# Patient Record
Sex: FEMALE | Race: WHITE | Employment: OTHER | URBAN - METROPOLITAN AREA
[De-identification: names, ages, dates, MRNs, and addresses within clinical notes are randomized per-mention and may not be internally consistent; named-entity substitution may affect disease eponyms.]

---

## 2018-09-06 ENCOUNTER — OFFICE VISIT (OUTPATIENT)
Dept: UROLOGY | Age: 38
End: 2018-09-06

## 2018-09-06 VITALS
SYSTOLIC BLOOD PRESSURE: 93 MMHG | HEIGHT: 67 IN | DIASTOLIC BLOOD PRESSURE: 59 MMHG | OXYGEN SATURATION: 98 % | WEIGHT: 174 LBS | BODY MASS INDEX: 27.31 KG/M2 | HEART RATE: 76 BPM

## 2018-09-06 DIAGNOSIS — N13.5 OBSTRUCTION OF RIGHT URETEROPELVIC JUNCTION (UPJ): ICD-10-CM

## 2018-09-06 DIAGNOSIS — N39.0 RECURRENT UTI: Primary | ICD-10-CM

## 2018-09-06 NOTE — MR AVS SNAPSHOT
[facility] [address] [address] 
[phone] Patient: Ailyn Hendricks MRN: [id number] MBN:[id number] Visit Information Date & Time Provider Department Dept. Phone Encounter #  
 9/6/2018  2:30 PM Bill Rodas [place] [facility] [phone] [id number] Follow-up Instructions Return in about 1 month (around 10/6/2018). Follow-up and Disposition History Your Appointments 10/15/2018  2:15 PM  
Office Visit with Bud Keating MD  
[facility] Samantha Vargheseg) Appt Note: after ct scan [address] [facility] [address] [address]  
[phone] [address] [place] [address] Upcoming Health Maintenance Date Due DTaP/Tdap/Td series (1 - Tdap) 7/23/2000 PAP AKA CERVICAL CYTOLOGY 7/23/2000 Influenza Age 5 to Adult 8/1/2018 Allergies as of 9/6/2018  Review Complete On: 9/6/2018 By: Bud Keating MD  
 No Known Allergies Current Immunizations  Never Reviewed No immunizations on file. Not reviewed this visit You Were Diagnosed With   
  
 Codes Comments Recurrent UTI    -  Primary ICD-10-CM: N39.0 ICD-9-CM: 599.0 Obstruction of right ureteropelvic junction (UPJ)     ICD-10-CM: N13.5 ICD-9-CM: 593.4 Vitals BP Pulse Height(growth percentile) Weight(growth percentile) SpO2 BMI  
 93/59 (BP 1 Location: Left arm, BP Patient Position: Sitting) 76 5' 7\" (1.702 m) 174 lb (78.9 kg) 98% 27.25 kg/m2 Smoking Status Never Smoker Vitals History BMI and BSA Data Body Mass Index Body Surface Area  
 27.25 kg/m 2 1.93 m 2 Your Updated Medication List  
  
   
This list is accurate as of 9/6/18  3:48 PM.  Always use your most recent med list.  
  
  
  
  
 DOXYCYCLINE CALCIUM PO Take  by mouth. We Performed the Following AMB POC URINALYSIS DIP STICK AUTO W/O MICRO [20279 CPT(R)] Follow-up Instructions Return in about 1 month (around 10/6/2018). Patient Instructions Urinary Tract Infection in Pregnancy: Care Instructions Your Care Instructions A urinary tract infection, or UTI, is an infection of the bladder and other urinary structures. Most UTIs occur in the bladder. They often cause pain or burning when you urinate. UTI is the most common bacterial infection in pregnancy. If untreated, a UTI could lead to problems such as a kidney infection or  labor. Most UTIs can be cured with antibiotics. Your doctor will prescribe an antibiotic that is safe during pregnancy. Be sure to finish your medicine so that the infection does not spread to your kidneys. Follow-up care is a key part of your treatment and safety. Be sure to make and go to all appointments, and call your doctor if you are having problems. It's also a good idea to know your test results and keep a list of the medicines you take. How can you care for yourself at home? · Take your antibiotics as directed. Do not stop taking them just because you feel better. You need to take the full course of antibiotics. · Drink extra water and other fluids for the next day or two. This will help wash out the bacteria causing the infection. If you have kidney, heart, or liver disease and have to limit fluids, talk with your doctor before you increase the amount of fluids you drink. · Do not drink alcohol. · Urinate often. Try to empty your bladder each time. Preventing UTIs · Drink plenty of fluids, enough so that your urine is light yellow or clear like water. This helps you urinate often, which clears bacteria from your system. If you have kidney, heart, or liver disease and have to limit fluids, talk with your doctor before you increase the amount of fluids you drink. · Urinate when you first have the urge. · Urinate right after you have sex. This is the best way for women to avoid UTIs. · When going to the bathroom, wipe from front to back to keep bacteria from entering the vagina or urethra. When should you call for help? Call your doctor now or seek immediate medical care if: 
  · You have symptoms of a worse urinary tract infection. These may include: 
¨ Pain or burning when you urinate. ¨ A frequent need to urinate without being able to pass much urine. ¨ Pain in the flank, which is just below the rib cage and above the waist on either side of the back. ¨ Blood in your urine. ¨ A fever.  
 Watch closely for changes in your health, and be sure to contact your doctor if: 
  · You do not get better as expected. Where can you learn more? Go to http://kayleigh-felix.info/. Enter M982 in the search box to learn more about \"Urinary Tract Infection in Pregnancy: Care Instructions. \" Current as of: November 21, 2017 Content Version: 11.7 © 8669-0202 [employer]. Care instructions adapted under license by [employer] (which disclaims liability or warranty for this information). If you have questions about a medical condition or this instruction, always ask your healthcare professional. [place] [address] any warranty or liability for your use of this information. Patient Instructions History Introducing [facility]! [employer] introduces SocioSquare patient portal. Now you can access parts of your medical record, email your doctor's office, and request medication refills online. 1. In your internet browser, go to [email or web address] 2. Click on the First Time User? Click Here link in the Sign In box. You will see the New Member Sign Up page. 3. Enter your SocioSquare Access Code exactly as it appears below. You will not need to use this code after youve completed the sign-up process.  If you do not sign up before the expiration date, you must request a new code. · TechSkills Access Code: [email or web address] Expires: 12/5/2018  3:48 PM 
 
4. Enter the last four digits of your Social Security Number (xxxx) and Date of Birth (mm/dd/yyyy) as indicated and click Submit. You will be taken to the next sign-up page. 5. Create a TechSkills ID. This will be your TechSkills login ID and cannot be changed, so think of one that is secure and easy to remember. 6. Create a TechSkills password. You can change your password at any time. 7. Enter your Password Reset Question and Answer. This can be used at a later time if you forget your password. 8. Enter your e-mail address. You will receive e-mail notification when new information is available in [address] 9. Click Sign Up. You can now view and download portions of your medical record. 10. Click the Download Summary menu link to download a portable copy of your medical information. If you have questions, please visit the Frequently Asked Questions section of the TechSkills website. Remember, TechSkills is NOT to be used for urgent needs. For medical emergencies, dial 911. Now available from your iPhone and Android! Please provide this summary of care documentation to your next provider. Your primary care clinician is listed as Phys Other. If you have any questions after today's visit, please call [phone].

## 2018-09-06 NOTE — PROGRESS NOTES
Ms. Colt Martines has a reminder for a \"due or due soon\" health maintenance. I have asked that she contact her primary care provider for follow-up on this health maintenance.

## 2018-09-06 NOTE — PROGRESS NOTES
Chief Complaint Patient presents with  New Patient  Recurrent UTI HISTORY OF PRESENT ILLNESS:  Shanita Crisostomo is a 43 y.o. female comes in with a history of some recurrent urinary infections manifested by bladder discomfort urinary frequency and urgency and nocturia. Rarely has she ever had any fever but she did one time earlier this year. She also had some right sided abdominal discomfort but not specifically CVA pain. She has not had any gross hematuria, has never had any stone disease. She has a 9year-old child but had no problems with urinary infections during that pregnancy. She said that she had an evaluation of her infections performed once before and was told she had some type of  \"natural\" blockage of that right kidney. It was never evaluated further after that. She has what sounds like a CT scan but I do not have access to any of those films. Past Medical History:  
Diagnosis Date  Acid indigestion History reviewed. No pertinent surgical history. Social History Substance Use Topics  Smoking status: Never Smoker  Smokeless tobacco: Never Used  Alcohol use Yes No Known Allergies Family History Problem Relation Age of Onset  Hypertension Mother  Cancer Paternal Grandfather Current Outpatient Prescriptions Medication Sig Dispense Refill  DOXYCYCLINE CALCIUM PO Take  by mouth. REVIEW OF SYSTEMS:  
Documented on the chart PHYSICAL EXAMINATION:  
 
Visit Vitals  BP 93/59 (BP 1 Location: Left arm, BP Patient Position: Sitting)  Pulse 76  Ht 5' 7\" (1.702 m)  Wt 174 lb (78.9 kg)  SpO2 98%  BMI 27.25 kg/m2 Constitutional: Well developed, well-nourished female in no acute distress. CV:  No peripheral swelling noted Respiratory: No respiratory distress or difficulties Abdomen:  Soft and nontender. No masses. No hepatosplenomegaly.  Female:  No CVA tenderness. A formal pelvic exam is not performed today. Skin:  Normal color. No evidence of jaundice. Neuro/Psych:  Patient with appropriate affect. Alert and oriented. Lymphatic:   No enlargement of supraclavicular lymph nodes. Results for orders placed or performed during the hospital encounter of 02/15/18 POC URINE MACROSCOPIC Result Value Ref Range Glucose Negative NEGATIVE,Negative mg/dl Bilirubin Negative NEGATIVE,Negative Ketone Negative NEGATIVE,Negative mg/dl Specific gravity 1.015 1.005 - 1.030 Blood Moderate (A) NEGATIVE,Negative pH (UA) 5.5 5 - 9 Protein 100 (A) NEGATIVE,Negative mg/dl Urobilinogen 0.2 0.0 - 1.0 EU/dl Nitrites Positive (A) NEGATIVE,Negative Leukocyte Esterase Large (A) NEGATIVE,Negative Color Yellow Appearance Slightly Cloudy POC URINE MICROSCOPIC Result Value Ref Range Epithelial cells, squamous 5-9 /LPF WBC TNTC /HPF  
 RBC 5-9 /HPF Bacteria 3+ /HPF  
POC HCG,URINE Result Value Ref Range HCG urine, QL negative NEGATIVE,Negative,negative REVIEW OF LABS AND IMAGING:   
 
Imaging Report Reviewed? NO Images Reviewed? NO Other Lab Data Reviewed? YES  
 
ASSESSMENT:  
  ICD-10-CM ICD-9-CM 1. Recurrent UTI N39.0 599.0 AMB POC URINALYSIS DIP STICK AUTO W/O MICRO 2. Obstruction of right ureteropelvic junction (UPJ) N13.5 593.4 PLAN/DISCUSSION: A review of her past lab data show an infection back in February with pyuria and some microhematuria as well as a positive nitrate test and I think that is when she had her fever. I would like to reevaluate that kidney with a renal ultrasound and a Lasix washout study. Will make arrangements to that and I will talk to her after those studies are complete. Her urinalysis today is completely normal so there is no need for any antibiotics today. Patient voices understanding and agreement to the plan. Lawrence Jackson MD on 9/6/2018 Please note: This document has been produced using voice recognition software. Unrecognized errors in transcription may be present.

## 2018-09-06 NOTE — PROGRESS NOTES
RBV Per Dr. Gustavo Crystal order placed for patient to have renal ultrasound for UPJ obstruction. RBV Per Dr. Gustavo Crystal order placed for patient to have Lasix washout renal scan for UPJ obstruction.

## 2018-09-06 NOTE — PATIENT INSTRUCTIONS
Urinary Tract Infection in Pregnancy: Care Instructions Your Care Instructions A urinary tract infection, or UTI, is an infection of the bladder and other urinary structures. Most UTIs occur in the bladder. They often cause pain or burning when you urinate. UTI is the most common bacterial infection in pregnancy. If untreated, a UTI could lead to problems such as a kidney infection or  labor. Most UTIs can be cured with antibiotics. Your doctor will prescribe an antibiotic that is safe during pregnancy. Be sure to finish your medicine so that the infection does not spread to your kidneys. Follow-up care is a key part of your treatment and safety. Be sure to make and go to all appointments, and call your doctor if you are having problems. It's also a good idea to know your test results and keep a list of the medicines you take. How can you care for yourself at home? · Take your antibiotics as directed. Do not stop taking them just because you feel better. You need to take the full course of antibiotics. · Drink extra water and other fluids for the next day or two. This will help wash out the bacteria causing the infection. If you have kidney, heart, or liver disease and have to limit fluids, talk with your doctor before you increase the amount of fluids you drink. · Do not drink alcohol. · Urinate often. Try to empty your bladder each time. Preventing UTIs · Drink plenty of fluids, enough so that your urine is light yellow or clear like water. This helps you urinate often, which clears bacteria from your system. If you have kidney, heart, or liver disease and have to limit fluids, talk with your doctor before you increase the amount of fluids you drink. · Urinate when you first have the urge. · Urinate right after you have sex. This is the best way for women to avoid UTIs. · When going to the bathroom, wipe from front to back to keep bacteria from entering the vagina or urethra. When should you call for help? Call your doctor now or seek immediate medical care if: 
  · You have symptoms of a worse urinary tract infection. These may include: 
¨ Pain or burning when you urinate. ¨ A frequent need to urinate without being able to pass much urine. ¨ Pain in the flank, which is just below the rib cage and above the waist on either side of the back. ¨ Blood in your urine. ¨ A fever.  
 Watch closely for changes in your health, and be sure to contact your doctor if: 
  · You do not get better as expected. Where can you learn more? Go to http://kayleigh-felix.info/. Enter M982 in the search box to learn more about \"Urinary Tract Infection in Pregnancy: Care Instructions. \" Current as of: November 21, 2017 Content Version: 11.7 © 7635-1091 [employer]. Care instructions adapted under license by [employer] (which disclaims liability or warranty for this information). If you have questions about a medical condition or this instruction, always ask your healthcare professional. [place] [address] any warranty or liability for your use of this information.

## 2018-09-07 LAB
BILIRUB UR QL STRIP: NEGATIVE
GLUCOSE UR-MCNC: NEGATIVE MG/DL
KETONES P FAST UR STRIP-MCNC: NEGATIVE MG/DL
PH UR STRIP: 5.5 [PH] (ref 4.6–8)
PROT UR QL STRIP: NEGATIVE
SP GR UR STRIP: 1.01 (ref 1–1.03)
UA UROBILINOGEN AMB POC: NORMAL (ref 0.2–1)
URINALYSIS CLARITY POC: CLEAR
URINALYSIS COLOR POC: YELLOW
URINE BLOOD POC: NEGATIVE
URINE LEUKOCYTES POC: NEGATIVE
URINE NITRITES POC: NEGATIVE

## 2018-09-19 ENCOUNTER — HOSPITAL ENCOUNTER (OUTPATIENT)
Dept: ULTRASOUND IMAGING | Age: 38
Discharge: HOME | End: 2018-09-19
Attending: UROLOGY
Payer: OTHER GOVERNMENT

## 2018-09-19 ENCOUNTER — HOSPITAL ENCOUNTER (OUTPATIENT)
Dept: NUCLEAR MEDICINE | Age: 38
Discharge: HOME | End: 2018-09-19
Attending: UROLOGY
Payer: OTHER GOVERNMENT

## 2018-09-19 DIAGNOSIS — N13.5 OBSTRUCTION OF RIGHT URETEROPELVIC JUNCTION (UPJ): ICD-10-CM

## 2018-09-19 PROCEDURE — 76770 US EXAM ABDO BACK WALL COMP: CPT

## 2018-09-19 PROCEDURE — 74011250636 HC RX REV CODE- 250/636: Performed by: UROLOGY

## 2018-09-19 PROCEDURE — 78708 K FLOW/FUNCT IMAGE W/DRUG: CPT

## 2018-09-19 RX ORDER — FUROSEMIDE 10 MG/ML
40 INJECTION INTRAMUSCULAR; INTRAVENOUS
Status: COMPLETED | OUTPATIENT
Start: 2018-09-19 | End: 2018-09-19

## 2018-09-19 RX ADMIN — FUROSEMIDE 40 MG: 10 INJECTION, SOLUTION INTRAMUSCULAR; INTRAVENOUS at 16:08

## 2018-09-20 NOTE — PROGRESS NOTES
Pelvic junction obstruction has been confirmed and patient has an appointment on 15 October to see Dr. Viry Carr

## 2018-10-15 ENCOUNTER — OFFICE VISIT (OUTPATIENT)
Dept: UROLOGY | Age: 38
End: 2018-10-15

## 2018-10-15 VITALS
OXYGEN SATURATION: 98 % | BODY MASS INDEX: 27.78 KG/M2 | HEIGHT: 67 IN | DIASTOLIC BLOOD PRESSURE: 60 MMHG | HEART RATE: 53 BPM | WEIGHT: 177 LBS | SYSTOLIC BLOOD PRESSURE: 108 MMHG

## 2018-10-15 DIAGNOSIS — N13.30 HYDRONEPHROSIS OF RIGHT KIDNEY: ICD-10-CM

## 2018-10-15 DIAGNOSIS — Q62.11 HYDRONEPHROSIS WITH URETEROPELVIC JUNCTION (UPJ) OBSTRUCTION: Primary | ICD-10-CM

## 2018-10-15 NOTE — PROGRESS NOTES
Chief Complaint   Patient presents with    Hydronephrosis       HISTORY OF PRESENT ILLNESS:  Maude Mcgregor is a 43 y.o. female presents  back to the office today in follow-up to her renal ultrasound and nuclear renogram.  This confirms significant hydronephrosis of that right kidney secondary to UPJ obstruction. In talking with her further and going over the x-rays, she apparently knew about this but it does not sound like it was severe 5 or 6 years ago. I told her that I think if the findings then were anything like the findings today, she would have had a procedure done at that time. Nevertheless on review of the study there is adequate parenchyma and I think she would benefit from a UPJ repair. I think this could be carried out robotically and that would be the preferential method. Past Medical History:   Diagnosis Date    Acid indigestion        History reviewed. No pertinent surgical history. Social History   Substance Use Topics    Smoking status: Never Smoker    Smokeless tobacco: Never Used    Alcohol use Yes       No Known Allergies    Family History   Problem Relation Age of Onset    Hypertension Mother     Cancer Paternal Grandfather        Current Outpatient Prescriptions   Medication Sig Dispense Refill    DOXYCYCLINE CALCIUM PO Take  by mouth. REVIEW OF SYSTEMS:   Documented on the chart      PHYSICAL EXAMINATION:     Visit Vitals    /60 (BP 1 Location: Left arm, BP Patient Position: Sitting)    Pulse (!) 53    Ht 5' 7\" (1.702 m)    Wt 177 lb (80.3 kg)    SpO2 98%    BMI 27.72 kg/m2     Constitutional: Well developed, well-nourished female in no acute distress. CV:  No peripheral swelling noted  Respiratory: No respiratory distress or difficulties  Abdomen:  Soft and nontender. No masses. No hepatosplenomegaly.  Female:  No CVA tenderness. Skin:  Normal color. No evidence of jaundice. Neuro/Psych:  Patient with appropriate affect.   Alert and oriented. Lymphatic:   No enlargement of supraclavicular lymph nodes. Results for orders placed or performed in visit on 09/06/18   AMB POC URINALYSIS DIP STICK AUTO W/O MICRO   Result Value Ref Range    Color (UA POC) Yellow     Clarity (UA POC) Clear     Glucose (UA POC) Negative Negative    Bilirubin (UA POC) Negative Negative    Ketones (UA POC) Negative Negative    Specific gravity (UA POC) 1.015 1.001 - 1.035    Blood (UA POC) Negative Negative    pH (UA POC) 5.5 4.6 - 8.0    Protein (UA POC) Negative Negative    Urobilinogen (UA POC) 0.2 mg/dL 0.2 - 1    Nitrites (UA POC) Negative Negative    Leukocyte esterase (UA POC) Negative Negative         REVIEW OF LABS AND IMAGING:      Imaging Report Reviewed? YES      Images Reviewed? YES           Other Lab Data Reviewed? YES    ASSESSMENT:     ICD-10-CM ICD-9-CM    1. Hydronephrosis with ureteropelvic junction (UPJ) obstruction Q62.11 591 AMB POC URINALYSIS DIP STICK AUTO W/O MICRO   2. Hydronephrosis of right kidney N13.30 591                 PLAN/DISCUSSION: She has a significant right-sided hydronephrosis with intrarenal and calyceal dilation and some thinning of the cortex in the upper pole but I think this is probably a salvageable kidney. Nevertheless I would prefer her to see a robotic surgeon for further evaluation for preservation of the kidney as well as UPJ repair. She is agreeable and all of her questions have been answered. Patient voices understanding and agreement to the plan. Felice Velez MD on 10/15/2018           Please note: This document has been produced using voice recognition software. Unrecognized errors in transcription may be present.

## 2018-10-15 NOTE — MR AVS SNAPSHOT
[address] [facility] [address] [address] 
[phone] Patient: Kalyani Fung MRN: [id number] DUM:9/29/4716 Visit Information Date & Time Provider Department Dept. Phone Encounter #  
 10/15/2018  2:15 PM [facility]ble[facility] [phone] [id number] Follow-up Instructions Routing History Upcoming Health Maintenance Date Due DTaP/Tdap/Td series (1 - Tdap) 7/23/2000 PAP AKA CERVICAL CYTOLOGY 7/23/2000 Influenza Age 5 to Adult 8/1/2018 Allergies as of 10/15/2018  Review Complete On: 10/15/2018 By: Karla Trimble MD  
 No Known Allergies Current Immunizations  Never Reviewed No immunizations on file. Not reviewed this visit You Were Diagnosed With   
  
 Codes Comments Hydronephrosis with ureteropelvic junction (UPJ) obstruction    -  Primary ICD-10-CM: Q62.11 ICD-9-CM: 575 Hydronephrosis of right kidney     ICD-10-CM: N13.30 ICD-9-CM: 612 Vitals BP Pulse Height(growth percentile) Weight(growth percentile) SpO2 BMI  
 108/60 (BP 1 Location: Left arm, BP Patient Position: Sitting) (!) 53 5' 7\" (1.702 m) 177 lb (80.3 kg) 98% 27.72 kg/m2 Smoking Status Never Smoker Vitals History BMI and BSA Data Body Mass Index Body Surface Area  
 27.72 kg/m 2 1.95 m 2 Your Updated Medication List  
  
   
This list is accurate as of 10/15/18  3:01 PM.  Always use your most recent med list.  
  
  
  
  
 DOXYCYCLINE CALCIUM PO Take  by mouth. We Performed the Following AMB POC URINALYSIS DIP STICK AUTO W/O MICRO [35040 CPT(R)] Patient Instructions Learning About Hydronephrosis What is hydronephrosis? Hydronephrosis is swelling of the kidneys. It is caused by a buildup of urine.  This condition can happen if a tube that drains urine from your kidneys is blocked. The blockage can come from within the urinary tract or from pressure outside of the tract. Pregnancy is an example of an outside (external) cause. This condition is often caused by a blockage such as a kidney stone, tumor, or blood clot. It also can be caused by a problem in your urinary system that you were born with (congenital problem). What are the symptoms? Some of the common symptoms are: 
· Pain in one or both sides. · Stomach pain. · Blood in your urine. Some people have no symptoms. How is it diagnosed? Your doctor will do an ultrasound to look for a blockage in your urinary system. An ultrasound allows your doctor to see a picture of the organs and other structures in your belly (abdomen). You also may need blood and urine tests. How is it treated? Your treatment depends on the cause of the swelling. If it is caused by a blockage, your treatment will depend on the type of blockage you have. If the blockage is caused by a kidney stone, you may wait for the stone to pass. If hydronephrosis happens during pregnancy, it usually clears up on its own. You may need to have urine drained from your bladder or kidneys. A urinary catheter is a small, flexible tube that can be inserted through the urethra and into the bladder, allowing urine to drain. A nephrostomy catheter is a thin tube placed into your kidney to drain urine. Sometimes surgery is needed to clear the blockage. If you have a blockage, you should begin to feel better after the blockage is gone. Many people recover and have no long-term problems. But some may have kidney damage. If hydronephrosis was left untreated for a long time, the damage can be severe. Severe damage will require further treatment. Follow-up care is a key part of your treatment and safety. Be sure to make and go to all appointments, and call your doctor if you are having problems.  It's also a good idea to know your test results and keep a list of the medicines you take. Where can you learn more? Go to http://kayleigh-felix.info/. Enter S386 in the search box to learn more about \"Learning About Hydronephrosis. \" Current as of: March 15, 2018 Content Version: 11.8 © 1906-0575 Healthwise, Incorporated. Care instructions adapted under license by [employer] (which disclaims liability or warranty for this information). If you have questions about a medical condition or this instruction, always ask your healthcare professional. [address] any warranty or liability for your use of this information. Introducing hospitals & HEALTH SERVICES! [employer] introduces Affectv patient portal. Now you can access parts of your medical record, email your doctor's office, and request medication refills online. 1. In your internet browser, go to [email or web address] 2. Click on the First Time User? Click Here link in the Sign In box. You will see the New Member Sign Up page. 3. Enter your Affectv Access Code exactly as it appears below. You will not need to use this code after youve completed the sign-up process. If you do not sign up before the expiration date, you must request a new code. · Affectv Access Code: [email or web address] Expires: 12/5/2018  3:48 PM 
 
4. Enter the last four digits of your Social Security Number (xxxx) and Date of Birth (mm/dd/yyyy) as indicated and click Submit. You will be taken to the next sign-up page. 5. Create a CreativeWorxt ID. This will be your Affectv login ID and cannot be changed, so think of one that is secure and easy to remember. 6. Create a Affectv password. You can change your password at any time. 7. Enter your Password Reset Question and Answer. This can be used at a later time if you forget your password. 8. Enter your e-mail address. You will receive e-mail notification when new information is available in [address] 9. Click Sign Up. You can now view and download portions of your medical record. 10. Click the Download Summary menu link to download a portable copy of your medical information. If you have questions, please visit the Frequently Asked Questions section of the Broccol[employer]-games website. Remember, [employer] is NOT to be used for urgent needs. For medical emergencies, dial 911. Now available from your iPhone and Android! Please provide this summary of care documentation to your next provider. Your primary care clinician is listed as Barry Estrada. If you have any questions after today's visit, please call [phone].

## 2018-10-15 NOTE — PROGRESS NOTES
Ms. Lanie He has a reminder for a \"due or due soon\" health maintenance. I have asked that she contact her primary care provider for follow-up on this health maintenance.

## 2018-10-15 NOTE — PATIENT INSTRUCTIONS
Learning About Hydronephrosis  What is hydronephrosis? Hydronephrosis is swelling of the kidneys. It is caused by a buildup of urine. This condition can happen if a tube that drains urine from your kidneys is blocked. The blockage can come from within the urinary tract or from pressure outside of the tract. Pregnancy is an example of an outside (external) cause. This condition is often caused by a blockage such as a kidney stone, tumor, or blood clot. It also can be caused by a problem in your urinary system that you were born with (congenital problem). What are the symptoms? Some of the common symptoms are:  · Pain in one or both sides. · Stomach pain. · Blood in your urine. Some people have no symptoms. How is it diagnosed? Your doctor will do an ultrasound to look for a blockage in your urinary system. An ultrasound allows your doctor to see a picture of the organs and other structures in your belly (abdomen). You also may need blood and urine tests. How is it treated? Your treatment depends on the cause of the swelling. If it is caused by a blockage, your treatment will depend on the type of blockage you have. If the blockage is caused by a kidney stone, you may wait for the stone to pass. If hydronephrosis happens during pregnancy, it usually clears up on its own. You may need to have urine drained from your bladder or kidneys. A urinary catheter is a small, flexible tube that can be inserted through the urethra and into the bladder, allowing urine to drain. A nephrostomy catheter is a thin tube placed into your kidney to drain urine. Sometimes surgery is needed to clear the blockage. If you have a blockage, you should begin to feel better after the blockage is gone. Many people recover and have no long-term problems. But some may have kidney damage. If hydronephrosis was left untreated for a long time, the damage can be severe. Severe damage will require further treatment.   Follow-up care is a key part of your treatment and safety. Be sure to make and go to all appointments, and call your doctor if you are having problems. It's also a good idea to know your test results and keep a list of the medicines you take. Where can you learn more? Go to http://kayleigh-felix.info/. Enter S386 in the search box to learn more about \"Learning About Hydronephrosis. \"  Current as of: March 15, 2018  Content Version: 11.8  © 1223-8600 Healthwise, Incorporated. Care instructions adapted under license by jaja[employer]tv (which disclaims liability or warranty for this information). If you have questions about a medical condition or this instruction, always ask your healthcare professional. [address] any warranty or liability for your use of this information.

## 2018-11-14 ENCOUNTER — DOCUMENTATION ONLY (OUTPATIENT)
Dept: UROLOGY | Age: 38
End: 2018-11-14

## 2019-02-07 ENCOUNTER — HOSPITAL ENCOUNTER (OUTPATIENT)
Dept: PREADMISSION TESTING | Age: 39
Discharge: HOME | End: 2019-02-07
Payer: OTHER GOVERNMENT

## 2019-02-07 LAB
ANION GAP SERPL CALC-SCNC: 7 MMOL/L (ref 3–18)
APPEARANCE UR: CLEAR
APTT PPP: 31.5 SEC (ref 23–36.4)
BACTERIA URNS QL MICRO: ABNORMAL /HPF
BILIRUB UR QL: NEGATIVE
BUN SERPL-MCNC: 18 MG/DL (ref 7–18)
BUN/CREAT SERPL: 19 (ref 12–20)
CALCIUM SERPL-MCNC: 9.3 MG/DL (ref 8.5–10.1)
CHLORIDE SERPL-SCNC: 107 MMOL/L (ref 100–108)
CO2 SERPL-SCNC: 26 MMOL/L (ref 21–32)
COLOR UR: YELLOW
CREAT SERPL-MCNC: 0.94 MG/DL (ref 0.6–1.3)
EPITH CASTS URNS QL MICRO: ABNORMAL /LPF (ref 0–5)
ERYTHROCYTE [DISTWIDTH] IN BLOOD BY AUTOMATED COUNT: 15.8 % (ref 11.6–14.5)
GLUCOSE SERPL-MCNC: 94 MG/DL (ref 74–99)
GLUCOSE UR STRIP.AUTO-MCNC: NEGATIVE MG/DL
HCT VFR BLD AUTO: 38 % (ref 35–45)
HGB BLD-MCNC: 12.8 G/DL (ref 12–16)
HGB UR QL STRIP: NEGATIVE
INR PPP: 1 (ref 0.8–1.2)
KETONES UR QL STRIP.AUTO: NEGATIVE MG/DL
LEUKOCYTE ESTERASE UR QL STRIP.AUTO: ABNORMAL
MCH RBC QN AUTO: 27.3 PG (ref 24–34)
MCHC RBC AUTO-ENTMCNC: 33.7 G/DL (ref 31–37)
MCV RBC AUTO: 81 FL (ref 74–97)
NITRITE UR QL STRIP.AUTO: NEGATIVE
PH UR STRIP: 6.5 [PH] (ref 5–8)
PLATELET # BLD AUTO: 266 K/UL (ref 135–420)
PMV BLD AUTO: 10.2 FL (ref 9.2–11.8)
POTASSIUM SERPL-SCNC: 4.7 MMOL/L (ref 3.5–5.5)
PROT UR STRIP-MCNC: NEGATIVE MG/DL
PROTHROMBIN TIME: 12.8 SEC (ref 11.5–15.2)
RBC # BLD AUTO: 4.69 M/UL (ref 4.2–5.3)
RBC #/AREA URNS HPF: NEGATIVE /HPF (ref 0–5)
SODIUM SERPL-SCNC: 140 MMOL/L (ref 136–145)
SP GR UR REFRACTOMETRY: 1.01 (ref 1–1.03)
UROBILINOGEN UR QL STRIP.AUTO: 0.2 EU/DL (ref 0.2–1)
WBC # BLD AUTO: 7.4 K/UL (ref 4.6–13.2)
WBC URNS QL MICRO: ABNORMAL /HPF (ref 0–4)

## 2019-02-07 PROCEDURE — 80048 BASIC METABOLIC PNL TOTAL CA: CPT

## 2019-02-07 PROCEDURE — 85027 COMPLETE CBC AUTOMATED: CPT

## 2019-02-07 PROCEDURE — 85730 THROMBOPLASTIN TIME PARTIAL: CPT

## 2019-02-07 PROCEDURE — 85610 PROTHROMBIN TIME: CPT

## 2019-02-07 PROCEDURE — 36415 COLL VENOUS BLD VENIPUNCTURE: CPT

## 2019-02-07 PROCEDURE — 81001 URINALYSIS AUTO W/SCOPE: CPT

## 2019-02-07 PROCEDURE — 87086 URINE CULTURE/COLONY COUNT: CPT

## 2019-02-07 NOTE — PERIOP NOTES
PAT - SURGICAL PRE-ADMISSION INSTRUCTIONS 
 
NAME:  Gage Cage                                                          TODAY'S DATE:  2/7/2019 SURGERY DATE:  2/22/2019                                  SURGERY ARRIVAL TIME:   TBD 1. Do NOT eat or drink anything, including candy or gum, after MIDNIGHT on 02/21/19 , unless you have specific instructions from your Surgeon or Anesthesia Provider to do so. 2. No smoking on the day of surgery. 3. No alcohol 24 hours prior to the day of surgery. 4. No recreational drugs for one week prior to the day of surgery. 5. Leave all valuables, including money/purse, at home. 6. Remove all jewelry, nail polish, makeup (including mascara); no lotions, powders, deodorant, or perfume/cologne/after shave. 7. Glasses/Contact lenses and Dentures may be worn to the hospital.  They will be removed prior to surgery. 8. Call your doctor if symptoms of a cold or illness develop within 24 ours prior to surgery. 9. AN ADULT MUST DRIVE YOU HOME AFTER OUTPATIENT SURGERY. 10. If you are having an OUTPATIENT procedure, please make arrangements for a responsible adult to be with you for 24 hours after your surgery. 11. If you are admitted to the hospital, you will be assigned to a bed after surgery is complete. Normally a family member will not be able to see you until you are in your assigned bed. 15. Family is encouraged to accompany you to the hospital.  We ask visitors in the treatment area to be limited to ONE person at a time to ensure patient privacy. EXCEPTIONS WILL BE MADE AS NEEDED. 15. Children under 12 are discouraged from entering the treatment area and need to be supervised by an adult when in the waiting room. Special Instructions: 
 
NONE. Patient Prep: 
 
use CHG solution These surgical instructions were reviewed with Beatris Snyder during the PAT visit. Directions:   On the morning of surgery, please go to the [facility]. Enter the building from the [facility] entrance, 1st floor (next to the Emergency Room entrance). Take the elevator to the 2nd floor. Sign in at the Registration Desk. If you have any questions and/or concerns, please do not hesitate to call: 
(Prior to the day of surgery)  [facility] unit:  [phone] (Day of surgery)  [facility] unit:  [phone]

## 2019-02-08 LAB
BACTERIA SPEC CULT: NORMAL
SERVICE CMNT-IMP: NORMAL

## 2019-02-11 NOTE — H&P (VIEW-ONLY)
Encounter Diagnoses ICD-10-CM ICD-9-CM 1. UPJ obstruction, acquired N13.5 593.4 2. Pre-op evaluation Z01.818 V72.84 ASSESSMENT:  
 
1. Right Hydronephrosis 2/2 UPJ obstruction ANNA 9/19/2018 - Severe right hydronephrosis NM Renal Scan 9/19/2018 - Right ureteropelvic junction obstruction. T1/2:  left kidney - 6 minutes, right kidney - 182 minutes. Renal function 58% left, 41% right Upcoming right pyeloplasty on 2/22/19 2. Recurrent UTIs PLAN:   
· Reviewed pre op labs as outlined below · Repeat Ucx today · UA +2 blood but pt on menstrual cycle · EKG done with [facility]; will need to locate results prior to surgery. Pt will fax over EKG results · Answered all questions regarding surgery · Discussed expected post op course and expected stent discomfort · Follow up as scheduled for Minta Christie assisted pyeloplasty on 2/22/19 DISCUSSION: 
We discussed management options for UPJ obstruction with Pyeloplasty. We discussed r/b of each including minimally invasive nature of pyeloplasty. The patient desires pyeloplasty with robotic approach. We discussed r/b including infection, bleeding, blood transfusion, pain, anastamotic leak and or stricture, injury to kidney and/or surrounding organs, and global anesthesia and perioperative risks including CVA, MI, DVT, PE, pneumonia, and death. The patient understands and desires to proceed. Chief Complaint Patient presents with  Hydronephrosis HISTORY OF PRESENT ILLNESS:  Sancho Mondragon is a 43 y.o. female who is seen in follow up for hydronephrosis due to UPJ obstruction. ANNA and Renal Scan on 9/19/2018 both indicated severe right hydronephrosis secondary to a UPJ obstruction. Pt presents today pre operatively for pyeloplasty on 2/22/19 with Dr. Kimberly Zuñiga. She had recent pre op labs on 2/7/19 at [facility]. Her EKG was performed with [employer] and is not avaiable for review today. She will fax over She denies changes in medical history since she was last seen. Reports her father has had adverse reaction reaction in the past to anesthesia Denies /c/n/v Denies gross hematuria, dysuria Pain began ~4 years ago No Hx of kidney, abdominal surgeries Patient is in the [employer] REVIEW OF LABS AND IMAGING:  
 
 CT Abd/Pelv W WO Cont 11/20/2018 IMPRESSION: 
Severe right hydronephrosis ends abruptly at the right UPJ with multiple fluid 
contrast levels in the right renal pelvis on delayed phased images. No contrast is seen within the right ureter on the delayed phased images. No obstructive renal stones are seen. 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 MRI may be helpful for further evaluation of urogenital anomaly. ANNA 9/19/2018 IMPRESSION: 
1. Severe right hydronephrosis. Left kidney unremarkable. Bladder is somewhat 
distended prevoid, but near completely collapsed post void. NM RENAL SCAN 9/19/2018 IMPRESSION:  
Right ureteropelvic junction obstruction. Normal left kidney flow and function Renal function 58% left, 41% right T1/2 left kidney - 6 minutes T1/2 right kidney -182 minutes Results for orders placed or performed in visit on 02/13/19 AMB POC URINALYSIS DIP STICK AUTO W/O MICRO Result Value Ref Range Color (UA POC) Yellow Clarity (UA POC) Clear Glucose (UA POC) Negative Negative Bilirubin (UA POC) Negative Negative Ketones (UA POC) Negative Negative Specific gravity (UA POC) 1.005 1.001 - 1.035 Blood (UA POC) 2+ Negative pH (UA POC) 7.0 4.6 - 8.0 Protein (UA POC) Negative Negative Urobilinogen (UA POC) 0.2 mg/dL 0.2 - 1 Nitrites (UA POC) Negative Negative Leukocyte esterase (UA POC) Negative Negative CT Results: 
Results from Hospital Encounter encounter on 01/06/19 CTA CHEST W OR W WO CONT Narrative Indication: Chest pain Technique: 3 millimeter axial images with IV contrast obtained from lung apices 
to bases. 3-D MIP CTA images obtained. Comparison: 2 view chest 1/6/2019 Findings:   
 
Mediastinum: No aortic dissection or pulmonary embolus. No mass or adenopathy in 
mediastinum, kayla, or axilla. Heart size normal.  No pleural effusion. Lungs: 6 mm solid subpleural nodule left lower lobe image 72 series 5. There is 
a 4 mm groundglass subpleural nodule right middle lobe image 56 series 5. Lungs 
otherwise clear. No endobronchial lesion. Bones/ chest wall: No significant lesions. Below the diaphragm no significant lesion seen. Impression Impression: 1. No evidence of pulmonary embolus or aortic dissection. 2. Pulmonary nodules as described. Follow-up with Fleischner criteria below. Guidelines for Management of Incidental Pulmonary Nodules Detected on CT Images: 
From the Fleischner Society 2017 SOLID NODULES Nodule Size:  < 6 mm Low-Risk Patient:  No CT followup needed. High-Risk Patient: Optional CT follow-up at 12 months Low-Risk Patient with multiple nodules: No CT follow-up needed. High-Risk Patient with multiple nodules: Optional CT follow-up at 12 months. Nodule Size: 6-8 mm Low-Risk Patient: CT at 6-12 months, then consider CT at 18-24 months. High-Risk Patient:  Initial followup at 6-12 months and then at 18-24 months if 
no change. Low-Risk Patient with multiple nodules: CT at 3-6 months, then consider CT at 
18-24 months if no change. High-Risk Patient with multiple nodules: CT at 3-6 months, then at 18-24 months 
if no change. Nodule Size:  >8 mm Low-Risk Patient: Consider CT, PET/CT, or tissue sampling at 3 months. High-Risk Patient:  Consider CT, PET/CT, or tissue sampling at 3 months. Low-Risk Patient with multiple nodules: CT at 3-6 months, then consider CT at 
18-24 months if no change.  
High-Risk Patient with multiple nodules: CT at 3-6 months, then at 18-24 months 
if no change. SUBSOLID NODULES: 
 
Nodule Size:  < 6 mm Groundglass: No CT followup needed. Part solid: No CT followup needed. Multiple nodules: CT at 3-6 months. If stable, consider CT at 2 and 4 years. Nodule size > 6 mm Groundglass: CT at 6-12 months to confirm presence. Then CT every 2 years until 5 years. Part solid: CT at 3-6 months to confirm presence. If unchanged and solid 
component remains less than 6 mm, annual CT should be performed for 5 years. Multiple nodules: CT at 3-6 months. Subsequent management based on the most 
suspicious nodule. PLEASE NOTE DISTINCTION BETWEEN SOLID AND SUBSOLID NODULES. US Results: 
Results from [place] encounter on 09/19/18 US RETROPERITONEUM COMP Narrative EXAMINATION: Ultrasound retroperitoneum INDICATION: UPJ obstruction, recurrent UTI 
 
COMPARISON: None TECHNIQUE: Grayscale and color sonographic images of the retroperitoneum 
obtained. FINDINGS: 
 
Right kidney: 11.9 cm length. Normal echotexture. Severe hydronephrosis. No 
suspicious parenchymal lesions. Left kidney: 9.6 cm length. Normal echotexture. No focal abnormality or 
hydronephrosis. Spleen: 10.0 cm length. No focal abnormality. Bladder: Slightly distended measuring 490 cc prevoid volume, and nearly 
collapsed post void. Bilateral ureteral jets noted. Impression IMPRESSION: 
 
1. Severe right hydronephrosis. Left kidney unremarkable. Bladder is somewhat 
distended prevoid, but near completely collapsed post void. PHYSICAL EXAMINATION:  
 
Visit Vitals /80 Ht 5' 7\" (1.702 m) Wt 170 lb (77.1 kg) BMI 26.63 kg/m² Constitutional: Well developed, well-nourished female in no acute distress. CV:  No peripheral swelling noted Respiratory: No respiratory distress or difficulties Abdomen:  Soft and nontender. No masses. No hepatosplenomegaly.  Female:  No CVA tenderness. Skin:  Normal color. No evidence of jaundice. Neuro/Psych:  Patient with appropriate affect. Alert and oriented. Past Medical History:  
Diagnosis Date  Acid indigestion  Hydronephrosis  Recurrent UTI History reviewed. No pertinent surgical history. Social History Tobacco Use  Smoking status: Never Smoker  Smokeless tobacco: Never Used Substance Use Topics  Alcohol use: Yes Frequency: Never Comment: Occas.  Drug use: No  
 
 
No Known Allergies Family History Problem Relation Age of Onset  Hypertension Mother  Cancer Paternal Grandfather Review of Systems Constitutional: Fever: No 
Skin: Rash: No 
HEENT: Hearing difficulty: No 
Eyes: Blurred vision: No 
Cardiovascular: Chest pain: No 
Respiratory: Shortness of breath: No 
Gastrointestinal: Nausea/vomiting: No 
Musculoskeletal: Back pain: No 
Neurological: Weakness: No 
Psychological: Memory loss: No 
Comments/additional findings:  
 
Patient's BMI is out of the normal parameters. Information about BMI was given and patient was advised to follow-up with their PCP for further management. A copy of today's office visit with all pertinent imaging results and labs were sent to the referring physician. Maria Elena Siddiqui FNP-C [facility] [phone]

## 2019-02-21 ENCOUNTER — ANESTHESIA EVENT (OUTPATIENT)
Dept: SURGERY | Age: 39
DRG: 661 | End: 2019-02-21
Payer: OTHER GOVERNMENT

## 2019-02-22 ENCOUNTER — APPOINTMENT (OUTPATIENT)
Dept: GENERAL RADIOLOGY | Age: 39
DRG: 661 | End: 2019-02-22
Attending: UROLOGY
Payer: OTHER GOVERNMENT

## 2019-02-22 ENCOUNTER — HOSPITAL ENCOUNTER (INPATIENT)
Age: 39
LOS: 1 days | Discharge: HOME | DRG: 661 | End: 2019-02-23
Attending: UROLOGY | Admitting: UROLOGY
Payer: OTHER GOVERNMENT

## 2019-02-22 ENCOUNTER — ANESTHESIA (OUTPATIENT)
Dept: SURGERY | Age: 39
DRG: 661 | End: 2019-02-22
Payer: OTHER GOVERNMENT

## 2019-02-22 DIAGNOSIS — N13.5 URETERAL OBSTRUCTION: Primary | ICD-10-CM

## 2019-02-22 LAB — HCG UR QL: NEGATIVE

## 2019-02-22 PROCEDURE — 77030034850: Performed by: UROLOGY

## 2019-02-22 PROCEDURE — 77030018673: Performed by: UROLOGY

## 2019-02-22 PROCEDURE — 76060000039 HC ANESTHESIA 4 TO 4.5 HR: Performed by: UROLOGY

## 2019-02-22 PROCEDURE — 77030005546 HC CATH URETH FOL 3W BARD -A: Performed by: UROLOGY

## 2019-02-22 PROCEDURE — 74018 RADEX ABDOMEN 1 VIEW: CPT

## 2019-02-22 PROCEDURE — 77030002996 HC SUT SLK J&J -A: Performed by: UROLOGY

## 2019-02-22 PROCEDURE — 77030009848 HC PASSR SUT SET COOP -C: Performed by: UROLOGY

## 2019-02-22 PROCEDURE — 74011250637 HC RX REV CODE- 250/637: Performed by: UROLOGY

## 2019-02-22 PROCEDURE — 77030002986 HC SUT PROL J&J -A: Performed by: UROLOGY

## 2019-02-22 PROCEDURE — 77030018842 HC SOL IRR SOD CL 9% BAXT -A: Performed by: UROLOGY

## 2019-02-22 PROCEDURE — 77030013567 HC DRN WND RESERV BARD -A: Performed by: UROLOGY

## 2019-02-22 PROCEDURE — 77030002916 HC SUT ETHLN J&J -A: Performed by: UROLOGY

## 2019-02-22 PROCEDURE — 77030035045 HC TRCR ENDOSC VRSPRT BLDLSS COVD -B: Performed by: UROLOGY

## 2019-02-22 PROCEDURE — 8E0W0CZ ROBOTIC ASSISTED PROCEDURE OF TRUNK REGION, OPEN APPROACH: ICD-10-PCS | Performed by: UROLOGY

## 2019-02-22 PROCEDURE — 74011250636 HC RX REV CODE- 250/636: Performed by: NURSE ANESTHETIST, CERTIFIED REGISTERED

## 2019-02-22 PROCEDURE — 77030035048 HC TRCR ENDOSC OPTCL COVD -B: Performed by: UROLOGY

## 2019-02-22 PROCEDURE — 77030008771 HC TU NG SALEM SUMP -A: Performed by: ANESTHESIOLOGY

## 2019-02-22 PROCEDURE — 77030032490 HC SLV COMPR SCD KNE COVD -B: Performed by: UROLOGY

## 2019-02-22 PROCEDURE — 77030010939 HC CLP LIG TELE -B: Performed by: UROLOGY

## 2019-02-22 PROCEDURE — 0TB68ZZ EXCISION OF RIGHT URETER, VIA NATURAL OR ARTIFICIAL OPENING ENDOSCOPIC: ICD-10-PCS | Performed by: UROLOGY

## 2019-02-22 PROCEDURE — 81025 URINE PREGNANCY TEST: CPT

## 2019-02-22 PROCEDURE — 76210000006 HC OR PH I REC 0.5 TO 1 HR: Performed by: UROLOGY

## 2019-02-22 PROCEDURE — 74011250636 HC RX REV CODE- 250/636: Performed by: UROLOGY

## 2019-02-22 PROCEDURE — 74011000272 HC RX REV CODE- 272: Performed by: UROLOGY

## 2019-02-22 PROCEDURE — 0T768DZ DILATION OF RIGHT URETER WITH INTRALUMINAL DEVICE, VIA NATURAL OR ARTIFICIAL OPENING ENDOSCOPIC: ICD-10-PCS | Performed by: UROLOGY

## 2019-02-22 PROCEDURE — 77030008683 HC TU ET CUF COVD -A: Performed by: ANESTHESIOLOGY

## 2019-02-22 PROCEDURE — 77030003028 HC SUT VCRL J&J -A: Performed by: UROLOGY

## 2019-02-22 PROCEDURE — BT1D1ZZ FLUOROSCOPY OF RIGHT KIDNEY, URETER AND BLADDER USING LOW OSMOLAR CONTRAST: ICD-10-PCS | Performed by: UROLOGY

## 2019-02-22 PROCEDURE — 74011250636 HC RX REV CODE- 250/636

## 2019-02-22 PROCEDURE — 74011000250 HC RX REV CODE- 250

## 2019-02-22 PROCEDURE — 77030016151 HC PROTCTR LNS DFOG COVD -B: Performed by: UROLOGY

## 2019-02-22 PROCEDURE — 76010000880 HC OR TIME 4 TO 4.5HR INTENSV - TIER 2: Performed by: UROLOGY

## 2019-02-22 PROCEDURE — 65270000029 HC RM PRIVATE

## 2019-02-22 PROCEDURE — 77030031139 HC SUT VCRL2 J&J -A: Performed by: UROLOGY

## 2019-02-22 PROCEDURE — 77030010935 HC CLP LIG ABSRB TELE -B: Performed by: UROLOGY

## 2019-02-22 PROCEDURE — 77030018719 HC DRSG PTCH ANTIMIC J&J -A: Performed by: UROLOGY

## 2019-02-22 PROCEDURE — 74420 UROGRAPHY RTRGR +-KUB: CPT

## 2019-02-22 PROCEDURE — 88307 TISSUE EXAM BY PATHOLOGIST: CPT

## 2019-02-22 PROCEDURE — 74011000254 HC RX REV CODE- 254

## 2019-02-22 PROCEDURE — 77030002933 HC SUT MCRYL J&J -A: Performed by: UROLOGY

## 2019-02-22 PROCEDURE — 77030022704 HC SUT VLOC COVD -B: Performed by: UROLOGY

## 2019-02-22 PROCEDURE — 77030020703 HC SEAL CANN DISP INTU -B: Performed by: UROLOGY

## 2019-02-22 PROCEDURE — 74011000250 HC RX REV CODE- 250: Performed by: UROLOGY

## 2019-02-22 PROCEDURE — 77030039266 HC ADH SKN EXOFIN S2SG -A: Performed by: UROLOGY

## 2019-02-22 PROCEDURE — C2617 STENT, NON-COR, TEM W/O DEL: HCPCS | Performed by: UROLOGY

## 2019-02-22 PROCEDURE — 74011636320 HC RX REV CODE- 636/320: Performed by: UROLOGY

## 2019-02-22 PROCEDURE — 77030027138 HC INCENT SPIROMETER -A

## 2019-02-22 PROCEDURE — 77030012407 HC DRN WND BARD -B: Performed by: UROLOGY

## 2019-02-22 PROCEDURE — 77030010031 HC SCIS ENDOSC MPLR J&J -C: Performed by: UROLOGY

## 2019-02-22 PROCEDURE — 77030008477 HC STYL SATN SLP COVD -A: Performed by: ANESTHESIOLOGY

## 2019-02-22 PROCEDURE — 74011250637 HC RX REV CODE- 250/637

## 2019-02-22 PROCEDURE — 0TQ38ZZ REPAIR RIGHT KIDNEY PELVIS, VIA NATURAL OR ARTIFICIAL OPENING ENDOSCOPIC: ICD-10-PCS | Performed by: UROLOGY

## 2019-02-22 DEVICE — INLAY OPTIMA URETERAL STENT W/O GUIDEWIRE
Type: IMPLANTABLE DEVICE | Site: URETER | Status: FUNCTIONAL
Brand: BARD® INLAY OPTIMA® URETERAL STENT

## 2019-02-22 RX ORDER — MIDAZOLAM HYDROCHLORIDE 1 MG/ML
INJECTION, SOLUTION INTRAMUSCULAR; INTRAVENOUS AS NEEDED
Status: DISCONTINUED | OUTPATIENT
Start: 2019-02-22 | End: 2019-02-22 | Stop reason: HOSPADM

## 2019-02-22 RX ORDER — HEPARIN SODIUM 5000 [USP'U]/ML
5000 INJECTION, SOLUTION INTRAVENOUS; SUBCUTANEOUS ONCE
Status: COMPLETED | OUTPATIENT
Start: 2019-02-22 | End: 2019-02-22

## 2019-02-22 RX ORDER — SODIUM CHLORIDE 0.9 % (FLUSH) 0.9 %
5-40 SYRINGE (ML) INJECTION EVERY 8 HOURS
Status: DISCONTINUED | OUTPATIENT
Start: 2019-02-22 | End: 2019-02-23 | Stop reason: HOSPADM

## 2019-02-22 RX ORDER — HEPARIN SODIUM 5000 [USP'U]/ML
5000 INJECTION, SOLUTION INTRAVENOUS; SUBCUTANEOUS EVERY 8 HOURS
Status: DISCONTINUED | OUTPATIENT
Start: 2019-02-22 | End: 2019-02-23 | Stop reason: HOSPADM

## 2019-02-22 RX ORDER — SODIUM CHLORIDE 0.9 % (FLUSH) 0.9 %
5-40 SYRINGE (ML) INJECTION AS NEEDED
Status: DISCONTINUED | OUTPATIENT
Start: 2019-02-22 | End: 2019-02-23 | Stop reason: HOSPADM

## 2019-02-22 RX ORDER — OXYCODONE AND ACETAMINOPHEN 5; 325 MG/1; MG/1
1 TABLET ORAL
Qty: 18 TAB | Refills: 0 | Status: SHIPPED | OUTPATIENT
Start: 2019-02-22 | End: 2019-10-30

## 2019-02-22 RX ORDER — GLYCOPYRROLATE 0.2 MG/ML
INJECTION INTRAMUSCULAR; INTRAVENOUS AS NEEDED
Status: DISCONTINUED | OUTPATIENT
Start: 2019-02-22 | End: 2019-02-22 | Stop reason: HOSPADM

## 2019-02-22 RX ORDER — EPHEDRINE SULFATE 50 MG/ML
INJECTION, SOLUTION INTRAVENOUS AS NEEDED
Status: DISCONTINUED | OUTPATIENT
Start: 2019-02-22 | End: 2019-02-22 | Stop reason: HOSPADM

## 2019-02-22 RX ORDER — ONDANSETRON 2 MG/ML
4 INJECTION INTRAMUSCULAR; INTRAVENOUS
Status: DISCONTINUED | OUTPATIENT
Start: 2019-02-22 | End: 2019-02-23 | Stop reason: HOSPADM

## 2019-02-22 RX ORDER — INDOCYANINE GREEN AND WATER 25 MG
KIT INJECTION AS NEEDED
Status: DISCONTINUED | OUTPATIENT
Start: 2019-02-22 | End: 2019-02-22 | Stop reason: HOSPADM

## 2019-02-22 RX ORDER — NALOXONE HYDROCHLORIDE 0.4 MG/ML
0.4 INJECTION, SOLUTION INTRAMUSCULAR; INTRAVENOUS; SUBCUTANEOUS AS NEEDED
Status: DISCONTINUED | OUTPATIENT
Start: 2019-02-22 | End: 2019-02-23 | Stop reason: HOSPADM

## 2019-02-22 RX ORDER — BUPIVACAINE HYDROCHLORIDE 2.5 MG/ML
INJECTION, SOLUTION EPIDURAL; INFILTRATION; INTRACAUDAL AS NEEDED
Status: DISCONTINUED | OUTPATIENT
Start: 2019-02-22 | End: 2019-02-22 | Stop reason: HOSPADM

## 2019-02-22 RX ORDER — DIPHENHYDRAMINE HCL 25 MG
25 CAPSULE ORAL
Status: DISCONTINUED | OUTPATIENT
Start: 2019-02-22 | End: 2019-02-23 | Stop reason: HOSPADM

## 2019-02-22 RX ORDER — DEXAMETHASONE SODIUM PHOSPHATE 4 MG/ML
INJECTION, SOLUTION INTRA-ARTICULAR; INTRALESIONAL; INTRAMUSCULAR; INTRAVENOUS; SOFT TISSUE AS NEEDED
Status: DISCONTINUED | OUTPATIENT
Start: 2019-02-22 | End: 2019-02-22 | Stop reason: HOSPADM

## 2019-02-22 RX ORDER — SODIUM CHLORIDE, SODIUM LACTATE, POTASSIUM CHLORIDE, CALCIUM CHLORIDE 600; 310; 30; 20 MG/100ML; MG/100ML; MG/100ML; MG/100ML
100 INJECTION, SOLUTION INTRAVENOUS CONTINUOUS
Status: DISCONTINUED | OUTPATIENT
Start: 2019-02-22 | End: 2019-02-22 | Stop reason: HOSPADM

## 2019-02-22 RX ORDER — SODIUM CHLORIDE, SODIUM LACTATE, POTASSIUM CHLORIDE, CALCIUM CHLORIDE 600; 310; 30; 20 MG/100ML; MG/100ML; MG/100ML; MG/100ML
50 INJECTION, SOLUTION INTRAVENOUS CONTINUOUS
Status: DISCONTINUED | OUTPATIENT
Start: 2019-02-23 | End: 2019-02-22 | Stop reason: HOSPADM

## 2019-02-22 RX ORDER — TAMSULOSIN HYDROCHLORIDE 0.4 MG/1
0.4 CAPSULE ORAL
Qty: 60 CAP | Refills: 0 | Status: SHIPPED | OUTPATIENT
Start: 2019-02-22 | End: 2019-10-30

## 2019-02-22 RX ORDER — DOCUSATE SODIUM 100 MG/1
100 CAPSULE, LIQUID FILLED ORAL DAILY
Qty: 30 CAP | Refills: 0 | Status: SHIPPED | OUTPATIENT
Start: 2019-02-22 | End: 2019-03-24

## 2019-02-22 RX ORDER — SODIUM CHLORIDE, SODIUM LACTATE, POTASSIUM CHLORIDE, CALCIUM CHLORIDE 600; 310; 30; 20 MG/100ML; MG/100ML; MG/100ML; MG/100ML
125 INJECTION, SOLUTION INTRAVENOUS CONTINUOUS
Status: DISCONTINUED | OUTPATIENT
Start: 2019-02-22 | End: 2019-02-23 | Stop reason: HOSPADM

## 2019-02-22 RX ORDER — SODIUM CHLORIDE 0.9 % (FLUSH) 0.9 %
5-40 SYRINGE (ML) INJECTION EVERY 8 HOURS
Status: DISCONTINUED | OUTPATIENT
Start: 2019-02-22 | End: 2019-02-22 | Stop reason: HOSPADM

## 2019-02-22 RX ORDER — NEOSTIGMINE METHYLSULFATE 5 MG/5 ML
SYRINGE (ML) INTRAVENOUS AS NEEDED
Status: DISCONTINUED | OUTPATIENT
Start: 2019-02-22 | End: 2019-02-22 | Stop reason: HOSPADM

## 2019-02-22 RX ORDER — LIDOCAINE HYDROCHLORIDE 20 MG/ML
INJECTION, SOLUTION EPIDURAL; INFILTRATION; INTRACAUDAL; PERINEURAL AS NEEDED
Status: DISCONTINUED | OUTPATIENT
Start: 2019-02-22 | End: 2019-02-22 | Stop reason: HOSPADM

## 2019-02-22 RX ORDER — INSULIN LISPRO 100 [IU]/ML
INJECTION, SOLUTION INTRAVENOUS; SUBCUTANEOUS ONCE
Status: DISCONTINUED | OUTPATIENT
Start: 2019-02-23 | End: 2019-02-22 | Stop reason: HOSPADM

## 2019-02-22 RX ORDER — HYDROMORPHONE HYDROCHLORIDE 1 MG/ML
INJECTION, SOLUTION INTRAMUSCULAR; INTRAVENOUS; SUBCUTANEOUS AS NEEDED
Status: DISCONTINUED | OUTPATIENT
Start: 2019-02-22 | End: 2019-02-22 | Stop reason: HOSPADM

## 2019-02-22 RX ORDER — VECURONIUM BROMIDE FOR INJECTION 1 MG/ML
INJECTION, POWDER, LYOPHILIZED, FOR SOLUTION INTRAVENOUS AS NEEDED
Status: DISCONTINUED | OUTPATIENT
Start: 2019-02-22 | End: 2019-02-22 | Stop reason: HOSPADM

## 2019-02-22 RX ORDER — FENTANYL CITRATE 50 UG/ML
50 INJECTION, SOLUTION INTRAMUSCULAR; INTRAVENOUS AS NEEDED
Status: DISCONTINUED | OUTPATIENT
Start: 2019-02-22 | End: 2019-02-22 | Stop reason: HOSPADM

## 2019-02-22 RX ORDER — SODIUM CHLORIDE 0.9 % (FLUSH) 0.9 %
5-40 SYRINGE (ML) INJECTION AS NEEDED
Status: DISCONTINUED | OUTPATIENT
Start: 2019-02-22 | End: 2019-02-22 | Stop reason: HOSPADM

## 2019-02-22 RX ORDER — DOCUSATE SODIUM 100 MG/1
100 CAPSULE, LIQUID FILLED ORAL 2 TIMES DAILY
Status: DISCONTINUED | OUTPATIENT
Start: 2019-02-22 | End: 2019-02-23 | Stop reason: HOSPADM

## 2019-02-22 RX ORDER — OXYCODONE AND ACETAMINOPHEN 5; 325 MG/1; MG/1
1-2 TABLET ORAL
Status: DISCONTINUED | OUTPATIENT
Start: 2019-02-22 | End: 2019-02-23 | Stop reason: HOSPADM

## 2019-02-22 RX ORDER — CEFAZOLIN SODIUM 2 G/50ML
2 SOLUTION INTRAVENOUS
Status: COMPLETED | OUTPATIENT
Start: 2019-02-22 | End: 2019-02-22

## 2019-02-22 RX ORDER — CEFAZOLIN SODIUM 2 G/50ML
2 SOLUTION INTRAVENOUS EVERY 8 HOURS
Status: COMPLETED | OUTPATIENT
Start: 2019-02-22 | End: 2019-02-23

## 2019-02-22 RX ORDER — SODIUM CHLORIDE, SODIUM LACTATE, POTASSIUM CHLORIDE, CALCIUM CHLORIDE 600; 310; 30; 20 MG/100ML; MG/100ML; MG/100ML; MG/100ML
INJECTION, SOLUTION INTRAVENOUS
Status: DISCONTINUED | OUTPATIENT
Start: 2019-02-22 | End: 2019-02-22 | Stop reason: HOSPADM

## 2019-02-22 RX ORDER — FAMOTIDINE 20 MG/1
TABLET, FILM COATED ORAL
Status: COMPLETED
Start: 2019-02-22 | End: 2019-02-22

## 2019-02-22 RX ORDER — FENTANYL CITRATE 50 UG/ML
INJECTION, SOLUTION INTRAMUSCULAR; INTRAVENOUS AS NEEDED
Status: DISCONTINUED | OUTPATIENT
Start: 2019-02-22 | End: 2019-02-22 | Stop reason: HOSPADM

## 2019-02-22 RX ORDER — ONDANSETRON 2 MG/ML
INJECTION INTRAMUSCULAR; INTRAVENOUS AS NEEDED
Status: DISCONTINUED | OUTPATIENT
Start: 2019-02-22 | End: 2019-02-22 | Stop reason: HOSPADM

## 2019-02-22 RX ORDER — PROPOFOL 10 MG/ML
INJECTION, EMULSION INTRAVENOUS AS NEEDED
Status: DISCONTINUED | OUTPATIENT
Start: 2019-02-22 | End: 2019-02-22 | Stop reason: HOSPADM

## 2019-02-22 RX ORDER — HYDROMORPHONE HYDROCHLORIDE 1 MG/ML
.5-1 INJECTION, SOLUTION INTRAMUSCULAR; INTRAVENOUS; SUBCUTANEOUS
Status: DISCONTINUED | OUTPATIENT
Start: 2019-02-22 | End: 2019-02-23 | Stop reason: HOSPADM

## 2019-02-22 RX ORDER — HYDROMORPHONE HYDROCHLORIDE 2 MG/ML
0.5 INJECTION, SOLUTION INTRAMUSCULAR; INTRAVENOUS; SUBCUTANEOUS
Status: DISCONTINUED | OUTPATIENT
Start: 2019-02-22 | End: 2019-02-22 | Stop reason: HOSPADM

## 2019-02-22 RX ORDER — FAMOTIDINE 20 MG/1
20 TABLET, FILM COATED ORAL ONCE
Status: DISCONTINUED | OUTPATIENT
Start: 2019-02-23 | End: 2019-02-22 | Stop reason: HOSPADM

## 2019-02-22 RX ADMIN — EPHEDRINE SULFATE 10 MG: 50 INJECTION, SOLUTION INTRAVENOUS at 10:07

## 2019-02-22 RX ADMIN — VECURONIUM BROMIDE FOR INJECTION 2 MG: 1 INJECTION, POWDER, LYOPHILIZED, FOR SOLUTION INTRAVENOUS at 08:15

## 2019-02-22 RX ADMIN — DEXAMETHASONE SODIUM PHOSPHATE 8 MG: 4 INJECTION, SOLUTION INTRA-ARTICULAR; INTRALESIONAL; INTRAMUSCULAR; INTRAVENOUS; SOFT TISSUE at 08:05

## 2019-02-22 RX ADMIN — SODIUM CHLORIDE, SODIUM LACTATE, POTASSIUM CHLORIDE, AND CALCIUM CHLORIDE: 600; 310; 30; 20 INJECTION, SOLUTION INTRAVENOUS at 08:45

## 2019-02-22 RX ADMIN — ONDANSETRON 4 MG: 2 INJECTION INTRAMUSCULAR; INTRAVENOUS at 11:36

## 2019-02-22 RX ADMIN — SODIUM CHLORIDE, SODIUM LACTATE, POTASSIUM CHLORIDE, CALCIUM CHLORIDE: 600; 310; 30; 20 INJECTION, SOLUTION INTRAVENOUS at 11:43

## 2019-02-22 RX ADMIN — EPHEDRINE SULFATE 15 MG: 50 INJECTION, SOLUTION INTRAVENOUS at 08:12

## 2019-02-22 RX ADMIN — CEFAZOLIN SODIUM 2 G: 2 SOLUTION INTRAVENOUS at 15:06

## 2019-02-22 RX ADMIN — CEFAZOLIN SODIUM 2 G: 2 SOLUTION INTRAVENOUS at 23:12

## 2019-02-22 RX ADMIN — LIDOCAINE HYDROCHLORIDE 80 MG: 20 INJECTION, SOLUTION EPIDURAL; INFILTRATION; INTRACAUDAL; PERINEURAL at 07:46

## 2019-02-22 RX ADMIN — FENTANYL CITRATE 100 MCG: 50 INJECTION, SOLUTION INTRAMUSCULAR; INTRAVENOUS at 07:41

## 2019-02-22 RX ADMIN — SODIUM CHLORIDE, SODIUM LACTATE, POTASSIUM CHLORIDE, AND CALCIUM CHLORIDE 125 ML/HR: 600; 310; 30; 20 INJECTION, SOLUTION INTRAVENOUS at 14:00

## 2019-02-22 RX ADMIN — VECURONIUM BROMIDE FOR INJECTION 2 MG: 1 INJECTION, POWDER, LYOPHILIZED, FOR SOLUTION INTRAVENOUS at 09:30

## 2019-02-22 RX ADMIN — VECURONIUM BROMIDE FOR INJECTION 3 MG: 1 INJECTION, POWDER, LYOPHILIZED, FOR SOLUTION INTRAVENOUS at 10:18

## 2019-02-22 RX ADMIN — ONDANSETRON 4 MG: 2 INJECTION INTRAMUSCULAR; INTRAVENOUS at 17:47

## 2019-02-22 RX ADMIN — EPHEDRINE SULFATE 10 MG: 50 INJECTION, SOLUTION INTRAVENOUS at 09:05

## 2019-02-22 RX ADMIN — HYDROMORPHONE HYDROCHLORIDE 1 MG: 1 INJECTION, SOLUTION INTRAMUSCULAR; INTRAVENOUS; SUBCUTANEOUS at 10:49

## 2019-02-22 RX ADMIN — Medication 10 ML: at 23:12

## 2019-02-22 RX ADMIN — HEPARIN SODIUM 5000 UNITS: 5000 INJECTION INTRAVENOUS; SUBCUTANEOUS at 15:00

## 2019-02-22 RX ADMIN — SODIUM CHLORIDE, SODIUM LACTATE, POTASSIUM CHLORIDE, CALCIUM CHLORIDE: 600; 310; 30; 20 INJECTION, SOLUTION INTRAVENOUS at 07:52

## 2019-02-22 RX ADMIN — INDOCYANINE GREEN AND WATER 2.5 MG: KIT at 10:11

## 2019-02-22 RX ADMIN — HEPARIN SODIUM 5000 UNITS: 5000 INJECTION INTRAVENOUS; SUBCUTANEOUS at 06:34

## 2019-02-22 RX ADMIN — VECURONIUM BROMIDE FOR INJECTION 1 MG: 1 INJECTION, POWDER, LYOPHILIZED, FOR SOLUTION INTRAVENOUS at 11:06

## 2019-02-22 RX ADMIN — SODIUM CHLORIDE, SODIUM LACTATE, POTASSIUM CHLORIDE, AND CALCIUM CHLORIDE 50 ML/HR: 600; 310; 30; 20 INJECTION, SOLUTION INTRAVENOUS at 06:34

## 2019-02-22 RX ADMIN — HEPARIN SODIUM 5000 UNITS: 5000 INJECTION INTRAVENOUS; SUBCUTANEOUS at 23:11

## 2019-02-22 RX ADMIN — PROPOFOL 170 MG: 10 INJECTION, EMULSION INTRAVENOUS at 07:46

## 2019-02-22 RX ADMIN — GLYCOPYRROLATE 0.2 MG: 0.2 INJECTION INTRAMUSCULAR; INTRAVENOUS at 09:05

## 2019-02-22 RX ADMIN — VECURONIUM BROMIDE FOR INJECTION 8 MG: 1 INJECTION, POWDER, LYOPHILIZED, FOR SOLUTION INTRAVENOUS at 07:47

## 2019-02-22 RX ADMIN — FAMOTIDINE 20 MG: 20 TABLET ORAL at 06:34

## 2019-02-22 RX ADMIN — GLYCOPYRROLATE 0.4 MG: 0.2 INJECTION INTRAMUSCULAR; INTRAVENOUS at 11:44

## 2019-02-22 RX ADMIN — MIDAZOLAM HYDROCHLORIDE 2 MG: 1 INJECTION, SOLUTION INTRAMUSCULAR; INTRAVENOUS at 07:38

## 2019-02-22 RX ADMIN — Medication 4 MG: at 11:44

## 2019-02-22 RX ADMIN — OXYCODONE AND ACETAMINOPHEN 1 TABLET: 5; 325 TABLET ORAL at 23:10

## 2019-02-22 RX ADMIN — CEFAZOLIN SODIUM 2 G: 2 SOLUTION INTRAVENOUS at 08:03

## 2019-02-22 RX ADMIN — HYDROMORPHONE HYDROCHLORIDE 0.5 MG: 1 INJECTION, SOLUTION INTRAMUSCULAR; INTRAVENOUS; SUBCUTANEOUS at 18:47

## 2019-02-22 NOTE — ANESTHESIA PREPROCEDURE EVALUATION
Anesthetic History No history of anesthetic complications Review of Systems / Medical History Patient summary reviewed, nursing notes reviewed and pertinent labs reviewed Pulmonary Within defined limits Neuro/Psych Within defined limits Cardiovascular Exercise tolerance: >4 METS 
  
GI/Hepatic/Renal 
Within defined limits Endo/Other Within defined limits Other Findings Comments: Hx of hydronephrosis Physical Exam 
 
Airway Mallampati: II 
 
 
 
 
 Cardiovascular Regular rate and rhythm,  S1 and S2 normal,  no murmur, click, rub, or gallop Rhythm: regular Rate: normal 
 
 
 
 Dental 
No notable dental hx Pulmonary Breath sounds clear to auscultation Abdominal 
GI exam deferred Other Findings Anesthetic Plan ASA: 2 Anesthesia type: general 
 
 
 
 
Induction: Intravenous Anesthetic plan and risks discussed with: Patient

## 2019-02-22 NOTE — PERIOP NOTES
1213  Assumed care of patient upon arrival to PACU. Patient drowsy, responsive to verbal stimuli. Placed on monitor x3. VS taken, WNL. Visual pain scale 0. 
 
1215  Radiology called for post-op DEBBIEBRenae Brown [age]  POC () updated. In surgical waiting area. Notified of patient's room assignment. Will meet patient in room after picking up kid. 1302 TRANSFER - OUT REPORT: 
 
Verbal report given to Jenna ISAAC (name) on Nenita Moreira  being transferred to 2200 (unit) for routine post - op Report consisted of patients Situation, Background, Assessment and  
Recommendations(SBAR). Information from the following report(s) Procedure Summary, Intake/Output, Recent Results and Cardiac Rhythm Sinus chelita was reviewed with the receiving nurse. Lines:  
Peripheral IV 02/22/19 Left Hand (Active) Site Assessment Clean, dry, & intact 2/22/2019 12:13 PM  
Phlebitis Assessment 0 2/22/2019 12:13 PM  
Infiltration Assessment 0 2/22/2019 12:13 PM  
Dressing Status Clean, dry, & intact 2/22/2019 12:13 PM  
Dressing Type Transparent;Tape 2/22/2019 12:13 PM  
Hub Color/Line Status Pink;Flushed;End cap changed 2/22/2019 12:13 PM  
Alcohol Cap Used Yes 2/22/2019 12:13 PM  
   
Peripheral IV 02/22/19 Right Hand (Active) Site Assessment Clean, dry, & intact 2/22/2019 12:13 PM  
Phlebitis Assessment 0 2/22/2019 12:13 PM  
Infiltration Assessment 0 2/22/2019 12:13 PM  
Dressing Status Clean, dry, & intact 2/22/2019 12:13 PM  
Dressing Type Transparent;Tape 2/22/2019 12:13 PM  
Hub Color/Line Status Infusing;Green 2/22/2019 12:13 PM  
Action Taken Open ports on tubing capped 2/22/2019 12:13 PM  
Alcohol Cap Used Yes 2/22/2019 12:13 PM  
  
 
Opportunity for questions and clarification was provided. Patient transported with: 
 Registered Nurse Tech

## 2019-02-22 NOTE — PERIOP NOTES
Pre-Op Summary Pt arrived via car with family/friend and is oriented to time, place, person and situation. Patient with steady gait with none assistive devices. Visit Vitals /63 Pulse (!) 40 Temp 98.4 °F (36.9 °C) Resp 16 Ht 5' 7\" (1.702 m) Wt 78.5 kg (173 lb) LMP 02/15/2019 SpO2 100% BMI 27.10 kg/m² Peripheral IV located on Left hand . Patients [facility] Patient's point of contact is - margarita and their contact number is:  
 
Susie Strickland Spouse [phone] [facility] They will be leaving and coming back. They are able to receive medication information. They will be staying

## 2019-02-22 NOTE — PROGRESS NOTES
TRANSFER - IN REPORT: 
 
Verbal report received from Jennyefr Akers on Violeta Rajputs  being received from PACU for routine post - op Report consisted of patients Situation, Background, Assessment and  
Recommendations(SBAR). Information from the following report(s) SBAR, Procedure Summary and MAR was reviewed with the receiving nurse. Opportunity for questions and clarification was provided. 1310 Received pt via bed awake and alert in NAD. Lap sites to abdomen c/d/i, ALICIA drain in place with no drainage. Wearing SCD sleeves only. Reyes draining pinkish orange urine. Oriented to call bell, phone and IS with pt giving return demonstration. SCD pump applied to sleeves. Reports pain at 5/10.

## 2019-02-22 NOTE — PROGRESS NOTES
conducted a pre-surgery visit with Sancho Mondragon, who is a 43 y.o.,female. The  provided the following Interventions: 
Initiated a relationship of care and support. Offered prayer and assurance of continued prayers on patient's behalf. Plan: 
Chaplains will continue to follow and will provide pastoral care on an as needed/requested basis.  recommends bedside caregivers page  on duty if patient shows signs of acute spiritual or emotional distress. Reiseñor 3 Board Certified On license of [facility]  
[phone]

## 2019-02-22 NOTE — ANESTHESIA POSTPROCEDURE EVALUATION
Procedure(s): 
Davinci right PYELOPLASTY XI ROBOTIC ASSISTED CYSTOSCOPY, RIGHT RETROGRADE PYELOGRAM, RIGHT URETERAL STENT PLACEMENT. Anesthesia Post Evaluation Multimodal analgesia: multimodal analgesia used between 6 hours prior to anesthesia start to PACU discharge Patient location during evaluation: bedside Patient participation: complete - patient participated Level of consciousness: awake Pain management: adequate Airway patency: patent Anesthetic complications: no 
Cardiovascular status: stable Respiratory status: acceptable Hydration status: acceptable Post anesthesia nausea and vomiting:  controlled Visit Vitals /66 (BP 1 Location: Right arm, BP Patient Position: At rest) Pulse (!) 51 Temp 36.3 °C (97.4 °F) Resp 18 Ht 5' 7\" (1.702 m) Wt 78.5 kg (173 lb) SpO2 100% BMI 27.10 kg/m²

## 2019-02-22 NOTE — PROGRESS NOTES
1400 Received pt from PACU, drowsy, oriented x 4. Pain at tolerable level, no n/v. IV site no sign of infiltration. Lap sites c/d/i. With Alicia drain, no drainage yet noted. Fuentes in draining to dark yellow uo, to be dc'd courtney AM. 
 
1500 Milked ALICIA drain as there is hardly any drainage noted. 1600 Pt more awake now. 36 Felt nauseated from drinking broth but pt says she felt better nhow, refused to have zofran now. 1700 MD to pt room. 1800 Zofran given. 1845 Pt vomited x2 after dinner. Pain under control. IV site no sign of infiltration. Has not been oob yet. Lap sites c/d/i. HAs fuentes and ALICIA drain.

## 2019-02-22 NOTE — OP NOTES
Operative Note        Patient: [facility]  MRN: [id number]  Will Aburto  Date of Birth: .1980  Age: 43 y.o.  2/22/2019    Preoperative Diagnosis: Right UPJ obstruction     Postoperative Diagnosis: same     Surgeon: Jaylene Long) and Role:  * Alejandrina Castaneda MD - Primary     Assistant: Micah Telles MD     Anesthesia: General     Procedure:   Cystoscopy  Right retrograde pyelogram  Right ureteral stent placement  Robotic assisted right pyeloplasty        Procedure in Detail: This is 43 y.o. female  with a history of right flank pain and  After the risks benefits and alternatives were discussed, she presents now for operative intervention.      A surgical timeout was performed confirming patient's name, date of birth, laterality of surgery and antibiotics. Anesthesia was induced. The patient was placed in the lithotomy position. Her external genitalia were prepped and draped in the usual fashion. A 21 fr cystoscope was placed into the bladder. No abnormalities were noted. An open ended catheter was passed into the right ureteral orifice and up to the proximal ureter over a wire. The wire was removed and a retrograde pyelogram was performed revealing pinpoint narrowing at the UPJ and a severe hydronephrosis. The catheter was secured in placed to the fuentes using a silk tie. The patient was transferred to the left flank position with right side up. All pressure points were padded. Their abdomen and flank were draped in the usual sterile fashion.      An 8 mm incision was then made at the lateral border of the rectus in line with the 11th rib. The Veress needle was used to obtain pneumoperitoneum. An 8 mm port was placed. No injury from entry was noted. 2 more robotic 8 mm ports were placed and a 12 mm assistant port was placed. The robot was docked.       The colon was medialized to expose gerota's fascia. The renal pelvis and proximal ureter were identified and dissected out.  A lower pole artery was noted and cleaned off. The ureteropelvic junction was incised and spatulated widely laterally. A 2 cm segment of ureter was excised which included the stenotic UPJ. I passed a 8 fr red rubber down the ureter and it was quite open. The renal pelvis was quite open. The open ended ureteral catheter was removed. The pyeloplasty was performed using an 4-0 vicryl suture in a running fashion. A 7 fr x 26 cm stent was placed into the ureter and into the bladder. The proximal coil was placed in the kidney. The repair was completed. The bladder was filled with 400 cc of saline with reflux into the renal pelvis. There was no leak. The bladder was drained. A 15 fr anahi drain was placed and secured to the skin. Jt-thomassen device was used to close the assistant port. The wounds were irrigated and then closed using 4-0 monocryl in a subcuticular fashion.  Dermabond was applied.      The patient was then awoken from anesthesia and then the recovery room in stable condition.      Estimated Blood Loss:  25 cc       Implants: none     Specimens:   ID Type Source Tests Collected by Time Destination   1 : URETER PELVIC JUNCTION  Preservative Ureter  Moraima Wilson MD 2/22/2019 10:20 AM Pathology       Drains: 15 anahi drain and 16 fr fuentes      Complications:  None      Counts: Sponge and needle counts were correct times two.     Shagufta Amin MD\

## 2019-02-22 NOTE — INTERVAL H&P NOTE
H&P Update: 
Forrest Davila was seen and examined. History and physical has been reviewed. The patient has been examined.  There have been no significant clinical changes since the completion of the originally dated History and Physical. 
 
Signed By: Boom Dietz MD   
 February 22, 2019 7:19 AM

## 2019-02-22 NOTE — INTERVAL H&P NOTE
H&P Update: 
Darrow Homans was seen and examined. History and physical has been reviewed. The patient has been examined.  There have been no significant clinical changes since the completion of the originally dated History and Physical. 
 
Signed By: Bienvenido Crowe MD   
 February 22, 2019 7:19 AM

## 2019-02-23 VITALS
TEMPERATURE: 98 F | WEIGHT: 173 LBS | RESPIRATION RATE: 16 BRPM | HEIGHT: 67 IN | DIASTOLIC BLOOD PRESSURE: 69 MMHG | SYSTOLIC BLOOD PRESSURE: 118 MMHG | OXYGEN SATURATION: 96 % | BODY MASS INDEX: 27.15 KG/M2 | HEART RATE: 60 BPM

## 2019-02-23 LAB
ANION GAP SERPL CALC-SCNC: 8 MMOL/L (ref 3–18)
BUN SERPL-MCNC: 10 MG/DL (ref 7–18)
BUN/CREAT SERPL: 11 (ref 12–20)
CALCIUM SERPL-MCNC: 8.4 MG/DL (ref 8.5–10.1)
CHLORIDE SERPL-SCNC: 106 MMOL/L (ref 100–108)
CO2 SERPL-SCNC: 27 MMOL/L (ref 21–32)
CREAT SERPL-MCNC: 0.88 MG/DL (ref 0.6–1.3)
GLUCOSE SERPL-MCNC: 86 MG/DL (ref 74–99)
HCT VFR BLD AUTO: 34.5 % (ref 35–45)
HGB BLD-MCNC: 10.9 G/DL (ref 12–16)
POTASSIUM SERPL-SCNC: 4.3 MMOL/L (ref 3.5–5.5)
SODIUM SERPL-SCNC: 141 MMOL/L (ref 136–145)

## 2019-02-23 PROCEDURE — 74011250637 HC RX REV CODE- 250/637: Performed by: UROLOGY

## 2019-02-23 PROCEDURE — 36415 COLL VENOUS BLD VENIPUNCTURE: CPT

## 2019-02-23 PROCEDURE — 74011250636 HC RX REV CODE- 250/636: Performed by: UROLOGY

## 2019-02-23 PROCEDURE — 85018 HEMOGLOBIN: CPT

## 2019-02-23 PROCEDURE — 80048 BASIC METABOLIC PNL TOTAL CA: CPT

## 2019-02-23 RX ADMIN — CEFAZOLIN SODIUM 2 G: 2 SOLUTION INTRAVENOUS at 08:40

## 2019-02-23 RX ADMIN — Medication 10 ML: at 05:50

## 2019-02-23 RX ADMIN — OXYCODONE AND ACETAMINOPHEN 1 TABLET: 5; 325 TABLET ORAL at 04:26

## 2019-02-23 RX ADMIN — DOCUSATE SODIUM 100 MG: 100 CAPSULE, LIQUID FILLED ORAL at 08:40

## 2019-02-23 RX ADMIN — OXYCODONE AND ACETAMINOPHEN 2 TABLET: 5; 325 TABLET ORAL at 09:20

## 2019-02-23 RX ADMIN — HEPARIN SODIUM 5000 UNITS: 5000 INJECTION INTRAVENOUS; SUBCUTANEOUS at 05:50

## 2019-02-23 RX ADMIN — SODIUM CHLORIDE, SODIUM LACTATE, POTASSIUM CHLORIDE, AND CALCIUM CHLORIDE 125 ML/HR: 600; 310; 30; 20 INJECTION, SOLUTION INTRAVENOUS at 03:28

## 2019-02-23 NOTE — PROGRESS NOTES
0800   Received pt on bed, due to void, pt informed to call for assist.Bell with in reach, not ready for  Regular diet, per pt wants it at lunch time. 0930   Seen by Dr Dano Alan for discharge today after lunch, offered pain med but refused at this time, voided 600 cc post fuentes out. 1045  Been up  Walking in the room, urine is clear, pain under control. 1200  Discharge instructions given, verbalized understanding, incision looks clean, voiding well, ALICIA removed dressing applied no bleeding noted. 1400  Discharge home in good spirit.

## 2019-02-23 NOTE — PROGRESS NOTES
Problem: Falls - Risk of 
Goal: *Absence of Falls Document Orvel Buffy Fall Risk and appropriate interventions in the flowsheet. Outcome: Progressing Towards Goal 
Fall Risk Interventions: 
Mobility Interventions: Patient to call before getting OOB Mentation Interventions: Adequate sleep, hydration, pain control, Door open when patient unattended, Evaluate medications/consider consulting pharmacy Medication Interventions: Patient to call before getting OOB, Teach patient to arise slowly, Evaluate medications/consider consulting pharmacy Elimination Interventions: Call light in reach, Patient to call for help with toileting needs History of Falls Interventions: Door open when patient unattended

## 2019-02-23 NOTE — PROGRESS NOTES
Bedside and Verbal shift change report given to Freida Gerber RN (oncoming nurse) by Jason Sosa RN (offgoing nurse). Report included the following information SBAR, Kardex, Intake/Output and MAR. In the bed resting alert and stable pain tolerable call bell within reach will continue to monitor. 2040 Stable denied acute distress assisted oob ambulated in the room tolerated reported some dizziness denied n/v back to bed call bell within reach will continue to monitor. 0150 Sleeping soundly NAD noted call bell within reach will continue to monitor. 0600 Reyes discontinued tolerated without complaint out of room ambulated in the hallway gait steady denied dizziness/n/v pain tolerable. Back to bed watching tv call bell within reach . Bedside and Verbal shift change report given to Abel Lira RN (oncoming nurse) by Freida Gerber RN (offgoing nurse). Report included the following information SBAR, Kardex, Intake/Output, MAR and Recent Results.

## 2019-02-23 NOTE — DISCHARGE INSTRUCTIONS
DISCHARGE SUMMARY from Nurse    PATIENT INSTRUCTIONS:    After general anesthesia or intravenous sedation, for 24 hours or while taking prescription Narcotics:  · Limit your activities  · Do not drive and operate hazardous machinery  · Do not make important personal or business decisions  · Do  not drink alcoholic beverages  · If you have not urinated within 8 hours after discharge, please contact your surgeon on call. Report the following to your surgeon:  · Excessive pain, swelling, redness or odor of or around the surgical area  · Temperature over 100.5  · Nausea and vomiting lasting longer than 4 hours or if unable to take medications  · Any signs of decreased circulation or nerve impairment to extremity: change in color, persistent  numbness, tingling, coldness or increase pain  · Any questions    What to do at Home:  Recommended activity: Activity as tolerated and no driving for today,     If you experience any of the following symptoms like increasing pain  , please follow up with Jaime Barcenas  . *  Please give a list of your current medications to your Primary Care Provider. *  Please update this list whenever your medications are discontinued, doses are      changed, or new medications (including over-the-counter products) are added. *  Please carry medication information at all times in case of emergency situations. These are general instructions for a healthy lifestyle:    No smoking/ No tobacco products/ Avoid exposure to second hand smoke  Surgeon General's Warning:  Quitting smoking now greatly reduces serious risk to your health.     Obesity, smoking, and sedentary lifestyle greatly increases your risk for illness    A healthy diet, regular physical exercise & weight monitoring are important for maintaining a healthy lifestyle    You may be retaining fluid if you have a history of heart failure or if you experience any of the following symptoms:  Weight gain of 3 pounds or more overnight or 5 pounds in a week, increased swelling in our hands or feet or shortness of breath while lying flat in bed. Please call your doctor as soon as you notice any of these symptoms; do not wait until your next office visit. Recognize signs and symptoms of STROKE:    F-face looks uneven    A-arms unable to move or move unevenly    S-speech slurred or non-existent    T-time-call 911 as soon as signs and symptoms begin-DO NOT go       Back to bed or wait to see if you get better-TIME IS BRAIN. Warning Signs of HEART ATTACK     Call 911 if you have these symptoms:   Chest discomfort. Most heart attacks involve discomfort in the center of the chest that lasts more than a few minutes, or that goes away and comes back. It can feel like uncomfortable pressure, squeezing, fullness, or pain.  Discomfort in other areas of the upper body. Symptoms can include pain or discomfort in one or both arms, the back, neck, jaw, or stomach.  Shortness of breath with or without chest discomfort.  Other signs may include breaking out in a cold sweat, nausea, or lightheadedness. Don't wait more than five minutes to call 911 - MINUTES MATTER! Fast action can save your life. Calling 911 is almost always the fastest way to get lifesaving treatment. Emergency Medical Services staff can begin treatment when they arrive -- up to an hour sooner than if someone gets to the hospital by car. Patient armband removed and shredded  MyChart Activation    Thank you for requesting access to [employer]. Please follow the instructions below to securely access and download your online medical record. [employer] allows you to send messages to your doctor, view your test results, renew your prescriptions, schedule appointments, and more. How Do I Sign Up? 1. In your internet browser, go to [email or web address]  2. Click on the First Time User? Click Here link in the Sign In box.  You will be redirect to the New Member Sign Up page.  3. Enter your [employer] Access Code exactly as it appears below. You will not need to use this code after youve completed the sign-up process. If you do not sign up before the expiration date, you must request a new code. [employer] Access Code: [email or web address]  Expires: 3/24/2019 10:12 AM (This is the date your [employer] access code will )    4. Enter the last four digits of your Social Security Number (xxxx) and Date of Birth (mm/dd/yyyy) as indicated and click Submit. You will be taken to the next sign-up page. 5. Create a [employer]io[employer] ID. This will be your [employer] login ID and cannot be changed, so think of one that is secure and easy to remember. 6. Create a [employer] password. You can change your password at any time. 7. Enter your Password Reset Question and Answer. This can be used at a later time if you forget your password. 8. Enter your e-mail address. You will receive e-mail notification when new information is available in [address] 9. Click Sign Up. You can now view and download portions of your medical record. 10. Click the Download Summary menu link to download a portable copy of your medical information. Additional Information    If you have questions, please visit the Frequently Asked Questions section of the [employer] website at [email or web address] Remember, [employer] is NOT to be used for urgent needs. For medical emergencies, dial 911. The discharge information has been reviewed with the patient and spouse. The patient and spouse verbalized understanding. Discharge medications reviewed with the patient and spouse and appropriate educational materials and side effects teaching were provided.   ___________________________________________________________________________________________________________________________________  DISCHARGE SUMMARY from Nurse    PATIENT INSTRUCTIONS:    After general anesthesia or intravenous sedation, for 24 hours or while taking prescription Narcotics:  · Limit your activities  · Do not drive and operate hazardous machinery  · Do not make important personal or business decisions  · Do  not drink alcoholic beverages  · If you have not urinated within 8 hours after discharge, please contact your surgeon on call. Report the following to your surgeon:  · Excessive pain, swelling, redness or odor of or around the surgical area  · Temperature over 100.5  · Nausea and vomiting lasting longer than 4 hours or if unable to take medications  · Any signs of decreased circulation or nerve impairment to extremity: change in color, persistent  numbness, tingling, coldness or increase pain  · Any questions    What to do at Home:  Recommended activity: Activity as tolerated and no driving for today,     If you experience any of the following symptoms ***, please follow up with ***. *  Please give a list of your current medications to your Primary Care Provider. *  Please update this list whenever your medications are discontinued, doses are      changed, or new medications (including over-the-counter products) are added. *  Please carry medication information at all times in case of emergency situations. These are general instructions for a healthy lifestyle:    No smoking/ No tobacco products/ Avoid exposure to second hand smoke  Surgeon General's Warning:  Quitting smoking now greatly reduces serious risk to your health. Obesity, smoking, and sedentary lifestyle greatly increases your risk for illness    A healthy diet, regular physical exercise & weight monitoring are important for maintaining a healthy lifestyle    You may be retaining fluid if you have a history of heart failure or if you experience any of the following symptoms:  Weight gain of 3 pounds or more overnight or 5 pounds in a week, increased swelling in our hands or feet or shortness of breath while lying flat in bed.   Please call your doctor as soon as you notice any of these symptoms; do not wait until your next office visit. Recognize signs and symptoms of STROKE:    F-face looks uneven    A-arms unable to move or move unevenly    S-speech slurred or non-existent    T-time-call 911 as soon as signs and symptoms begin-DO NOT go       Back to bed or wait to see if you get better-TIME IS BRAIN. Warning Signs of HEART ATTACK     Call 911 if you have these symptoms:   Chest discomfort. Most heart attacks involve discomfort in the center of the chest that lasts more than a few minutes, or that goes away and comes back. It can feel like uncomfortable pressure, squeezing, fullness, or pain.  Discomfort in other areas of the upper body. Symptoms can include pain or discomfort in one or both arms, the back, neck, jaw, or stomach.  Shortness of breath with or without chest discomfort.  Other signs may include breaking out in a cold sweat, nausea, or lightheadedness. Don't wait more than five minutes to call 911 - MINUTES MATTER! Fast action can save your life. Calling 911 is almost always the fastest way to get lifesaving treatment. Emergency Medical Services staff can begin treatment when they arrive -- up to an hour sooner than if someone gets to the hospital by car. The discharge information has been reviewed with the {PATIENT PARENT GUARDIAN:23692}. The {PATIENT PARENT GUARDIAN:46912} verbalized understanding. Discharge medications reviewed with the {Dishcarge meds reviewed VCWV:28984} and appropriate educational materials and side effects teaching were provided.   ___________________________________________________________________________________________________________________________________

## 2019-02-23 NOTE — DISCHARGE SUMMARY
Discharge Summary     Patient ID:  Lisa Salmeron  [id number]  5 y.o.  1980    Admit Date: 2/22/2019    Discharge Date: 2/23/2019    * Admission Diagnoses: Ureteral obstruction [N13.5]    * Discharge Diagnoses:    Hospital Problems as of 2/23/2019 Date Reviewed: 2/13/2019          Codes Class Noted - Resolved POA    Ureteral obstruction ICD-10-CM: N13.5  ICD-9-CM: 593.4  2/22/2019 - Present Unknown               Admission Condition: Good    * Discharge Condition: good    * Procedures: Procedure(s):  Davinci right PYELOPLASTY XI ROBOTIC ASSISTED CYSTOSCOPY, RIGHT RETROGRADE PYELOGRAM, RIGHT URETERAL [address] [facility] Course:   Normal hospital course for this procedure. Consults: None    Significant Diagnostic Studies: labs:      Labs: Results:   Chemistry    Recent Labs     02/23/19 0410   GLU 86      K 4.3      CO2 27   BUN 10   CREA 0.88   CA 8.4*   AGAP 8   BUCR 11*      CBC w/Diff Recent Labs     02/23/19 0410   HGB 10.9*   HCT 34.5*      Cultures No results for input(s): CULT in the last 72 hours.   All Micro Results     None            Urinalysis Color   Date Value Ref Range Status   02/07/2019 YELLOW   Final     Appearance   Date Value Ref Range Status   02/07/2019 CLEAR   Final     Specific gravity   Date Value Ref Range Status   02/07/2019 1.006 1.005 - 1.030   Final     pH (UA)   Date Value Ref Range Status   02/07/2019 6.5 5.0 - 8.0   Final     Protein   Date Value Ref Range Status   02/07/2019 NEGATIVE  NEG mg/dL Final     Ketone   Date Value Ref Range Status   02/07/2019 NEGATIVE  NEG mg/dL Final     Bilirubin   Date Value Ref Range Status   02/07/2019 NEGATIVE  NEG   Final     Blood   Date Value Ref Range Status   02/07/2019 NEGATIVE  NEG   Final     Urobilinogen   Date Value Ref Range Status   02/07/2019 0.2 0.2 - 1.0 EU/dL Final     Nitrites   Date Value Ref Range Status   02/07/2019 NEGATIVE  NEG   Final     Leukocyte Esterase   Date Value Ref Range Status 02/07/2019 TRACE (A) NEG   Final     Potassium   Date Value Ref Range Status   02/23/2019 4.3 3.5 - 5.5 mmol/L Final     Creatinine   Date Value Ref Range Status   02/23/2019 0.88 0.6 - 1.3 MG/DL Final     BUN   Date Value Ref Range Status   02/23/2019 10 7.0 - 18 MG/DL Final      PSA No results for input(s): PSA in the last 72 hours. Coagulation Lab Results   Component Value Date/Time    Prothrombin time 12.8 02/07/2019 10:40 AM    INR 1.0 02/07/2019 10:40 AM    aPTT 31.5 02/07/2019 10:40 AM           * Disposition: Home    Discharge Medications:   Current Discharge Medication List      START taking these medications    Details   oxyCODONE-acetaminophen (PERCOCET) 5-325 mg per tablet Take 1 Tab by mouth every four (4) hours as needed for Pain. Max Daily Amount: 6 Tabs. May take 2 tabs for severe pain. Avoid driving while on this medication. Do not exceed 4000mg of acetaminophen per day  Qty: 18 Tab, Refills: 0    Associated Diagnoses: Ureteral obstruction      docusate sodium (COLACE) 100 mg capsule Take 1 Cap by mouth daily for 30 days. Continue while on narcotic pain medication to help prevent constipation  Qty: 30 Cap, Refills: 0      tamsulosin (FLOMAX) 0.4 mg capsule Take 1 Cap by mouth daily (after dinner). Qty: 60 Cap, Refills: 0             * Follow-up Care/Patient Instructions: Activity: No lifting, Driving, or Strenuous exercise for 6 weeks  Diet: Regular Diet  Wound Care: Keep wound clean and dry  Wound care discussed with patient. Follow-up Information     Follow up With Specialties Details Why Contact Info    Willie Cornejo Physician Assistant   [address] [facility] [place]  [phone]          Follow-up tests/labs at FU appointments    PCP:  Melanie Hunter PA-C  Referring Provider: No ref. provider found    Follow up with:Jacklyn Dumas PA-C in 30 days.   Follow up with:Dr. Mathur Poster as scheduled for early postop FU    Signed:  Isaura Anderson MD    [phone]    February 23, 2019 7:42 AM

## 2019-02-23 NOTE — ROUTINE PROCESS
Care Management Interventions PCP Verified by CM: Yes(VERONIKA Galvez) Mode of Transport at Discharge: Other (see comment)(spouse) Transition of Care Consult (CM Consult): Discharge Planning Discharge Durable Medical Equipment: No 
Physical Therapy Consult: No 
Occupational Therapy Consult: No 
Current Support Network: Lives with Spouse, Own Home Confirm Follow Up Transport: Self Plan discussed with Pt/Family/Caregiver: Yes Discharge Location Discharge Placement: Home

## (undated) DEVICE — STERILE LATEX POWDER-FREE SURGICAL GLOVESWITH NITRILE COATING: Brand: PROTEXIS

## (undated) DEVICE — DEVICE WND CLSR V-LOC 3-0 CV-23 90

## (undated) DEVICE — VESSEL LOOPS,MAXI, BLUE: Brand: DEVON

## (undated) DEVICE — 3M™ STERI-DRAPE™ INCISE DRAPE 1050 (60CM X 45CM): Brand: STERI-DRAPE™

## (undated) DEVICE — NEEDLE HYPO 25GA L1.5IN BLU POLYPR HUB S STL REG BVL STR

## (undated) DEVICE — COVER LT HNDL BLU PLAS

## (undated) DEVICE — SUTURE VCRL SZ 4-0 L27IN ABSRB UD L17MM RB-1 1/2 CIR J214H

## (undated) DEVICE — SHEET,DRAPE,70X100,STERILE: Brand: MEDLINE

## (undated) DEVICE — 3M™ TEGADERM™ TRANSPARENT FILM DRESSING FRAME STYLE, 1624W, 2-3/8 IN X 2-3/4 IN (6 CM X 7 CM), 100/CT 4CT/CASE: Brand: 3M™ TEGADERM™

## (undated) DEVICE — REM POLYHESIVE ADULT PATIENT RETURN ELECTRODE: Brand: VALLEYLAB

## (undated) DEVICE — SOL IRR NACL 0.9% 500ML POUR --

## (undated) DEVICE — VISUALIZATION SYSTEM: Brand: CLEARIFY

## (undated) DEVICE — DISPOSABLE SUCTION/IRRIGATOR TUBE SET WITH TIP: Brand: AHTO

## (undated) DEVICE — SUTURE PERMA-HAND SZ 0 L24IN NONABSORBABLE BLK W/O NDL SILK SA76G

## (undated) DEVICE — PREP SKN CHLRAPRP 26ML TNT -- CONVERT TO ITEM 373320

## (undated) DEVICE — SYR 10ML CTRL LR LCK NSAF LF --

## (undated) DEVICE — LIGHT HANDLE: Brand: DEVON

## (undated) DEVICE — CLIP LIG ABSRB HEM-LOK LG PUR --

## (undated) DEVICE — SUTURE MCRYL SZ 4-0 L18IN ABSRB UD L19MM PS-2 3/8 CIR PRIM Y496G

## (undated) DEVICE — SUTURE SZ 0 27IN 5/8 CIR UR-6  TAPER PT VIOLET ABSRB VICRYL J603H

## (undated) DEVICE — Device

## (undated) DEVICE — SOL INJ SOD CL 0.9% 1000ML BG --

## (undated) DEVICE — DRAIN SURG 15FR L3/16IN DIA4.7MM SIL CHN RND HUBLESS FULL

## (undated) DEVICE — GOWN,AURORA,FABRIC-REINFORCED,X-LARGE: Brand: MEDLINE

## (undated) DEVICE — BLADELESS OPTICAL TROCAR WITH FIXATION CANNULA: Brand: VERSAPORT

## (undated) DEVICE — COLUMN DRAPE

## (undated) DEVICE — TIP COVER ACCESSORY

## (undated) DEVICE — SUTURE VCRL SZ 0 L18IN ABSRB UD POLYGLACTIN 910 BRAID TIE J912G

## (undated) DEVICE — BLADE ASSEMB CLP HAIR FINE --

## (undated) DEVICE — SUT ETHLN 3-0 18IN PS1 BLK --

## (undated) DEVICE — DRAPE,UTILITY,TAPE,15X26,STERILE: Brand: MEDLINE

## (undated) DEVICE — ARM DRAPE

## (undated) DEVICE — KENDALL SCD EXPRESS SLEEVES, KNEE LENGTH, MEDIUM: Brand: KENDALL SCD

## (undated) DEVICE — TRAY CATH 16F URIN MTR LTX -- CONVERT TO ITEM 363111

## (undated) DEVICE — SPONGE GZ W2XL2IN 12 PLY 100% WVN COT PRE COUNTED

## (undated) DEVICE — SCISSORS ENDOSCP DIA5MM CRV MPLR CAUT W/ RATCH HNDL

## (undated) DEVICE — INTENDED FOR TISSUE SEPARATION, AND OTHER PROCEDURES THAT REQUIRE A SHARP SURGICAL BLADE TO PUNCTURE OR CUT.: Brand: BARD-PARKER ® CARBON RIB-BACK BLADES

## (undated) DEVICE — SUT PROL 4-0 36IN RB1 DA BLU --

## (undated) DEVICE — TRAY PREP DRY W/ PREM GLV 2 APPL 6 SPNG 2 UNDPD 1 OVERWRAP

## (undated) DEVICE — CATH URETH FOL 3W SH 18FRX5ML -- LUBRICATH

## (undated) DEVICE — APPLICATOR BNDG 1MM ADH PREMIERPRO EXOFIN

## (undated) DEVICE — TELFA NON-ADHERENT ABSORBENT DRESSING: Brand: TELFA

## (undated) DEVICE — DRSG PATCH ANTIMIC 1INX4.0MM -- CONVERT TO ITEM 356053

## (undated) DEVICE — CARTRIDGE CLP LIG HEMLOK GRN --

## (undated) DEVICE — BLADELESS OPTICAL TROCAR WITH FIXATION CANNULA: Brand: VERSAONE

## (undated) DEVICE — ELECTRO LUBE IS A SINGLE PATIENT USE DEVICE THAT IS INTENDED TO BE USED ON ELECTROSURGICAL ELECTRODES TO REDUCE STICKING.: Brand: KEY SURGICAL ELECTRO LUBE

## (undated) DEVICE — SEAL UNIV 5-8MM DISP BX/10 -- DA VINCI XI - SNGL USE

## (undated) DEVICE — INSTRMT SET WND CLSR SUT PASS --